# Patient Record
Sex: MALE | Race: WHITE | NOT HISPANIC OR LATINO | Employment: FULL TIME | URBAN - METROPOLITAN AREA
[De-identification: names, ages, dates, MRNs, and addresses within clinical notes are randomized per-mention and may not be internally consistent; named-entity substitution may affect disease eponyms.]

---

## 2019-03-15 ENCOUNTER — HOSPITAL ENCOUNTER (OUTPATIENT)
Dept: RADIOLOGY | Facility: HOSPITAL | Age: 54
Discharge: HOME | End: 2019-03-15
Attending: PHYSICIAN ASSISTANT
Payer: COMMERCIAL

## 2019-03-15 ENCOUNTER — OFFICE VISIT (OUTPATIENT)
Dept: ORTHOPEDICS | Facility: CLINIC | Age: 54
End: 2019-03-15
Payer: COMMERCIAL

## 2019-03-15 VITALS — BODY MASS INDEX: 31.81 KG/M2 | HEIGHT: 73 IN | WEIGHT: 240 LBS

## 2019-03-15 DIAGNOSIS — M25.571 RIGHT ANKLE PAIN, UNSPECIFIED CHRONICITY: ICD-10-CM

## 2019-03-15 DIAGNOSIS — M25.461 EFFUSION OF RIGHT KNEE: Primary | ICD-10-CM

## 2019-03-15 DIAGNOSIS — M25.561 RIGHT KNEE PAIN, UNSPECIFIED CHRONICITY: ICD-10-CM

## 2019-03-15 DIAGNOSIS — M17.11 PRIMARY OSTEOARTHRITIS OF ONE KNEE, RIGHT: ICD-10-CM

## 2019-03-15 PROBLEM — M19.071 PRIMARY OSTEOARTHRITIS OF RIGHT ANKLE: Status: ACTIVE | Noted: 2019-03-15

## 2019-03-15 LAB
APPEARANCE FLD: NORMAL
BODY FLD TYPE: ABNORMAL
BODY FLD TYPE: NORMAL
COLOR FLD: YELLOW
CRYSTALS FLD MICRO: POSITIVE
GRAM STN SPEC: NORMAL
GRAM STN SPEC: NORMAL
LYMPHOCYTES NFR FLD MANUAL: 18 %
MONOS+MACROS NFR FLD MANUAL: 11 %
NEUTROPHILS NFR FLD MANUAL: 71 %
PATH INTERP FLD-IMP: NORMAL
WBC # FLD: NORMAL /CU MM

## 2019-03-15 PROCEDURE — 73610 XR ANKLE COMPLETE 3 VIEW RIGHT: ICD-10-PCS | Mod: 26,RT,, | Performed by: RADIOLOGY

## 2019-03-15 PROCEDURE — 73562 XR KNEE ORTHO RIGHT WITH FLEXION: ICD-10-PCS | Mod: 26,59,RT, | Performed by: RADIOLOGY

## 2019-03-15 PROCEDURE — 73562 X-RAY EXAM OF KNEE 3: CPT | Mod: 26,59,RT, | Performed by: RADIOLOGY

## 2019-03-15 PROCEDURE — 20610 PR DRAIN/INJECT LARGE JOINT/BURSA: ICD-10-PCS | Mod: RT,S$GLB,, | Performed by: PHYSICIAN ASSISTANT

## 2019-03-15 PROCEDURE — 99204 OFFICE O/P NEW MOD 45 MIN: CPT | Mod: 25,S$GLB,, | Performed by: PHYSICIAN ASSISTANT

## 2019-03-15 PROCEDURE — 73564 XR KNEE ORTHO RIGHT WITH FLEXION: ICD-10-PCS | Mod: 26,RT,, | Performed by: RADIOLOGY

## 2019-03-15 PROCEDURE — 73562 X-RAY EXAM OF KNEE 3: CPT | Mod: TC,RT

## 2019-03-15 PROCEDURE — 99999 PR PBB SHADOW E&M-NEW PATIENT-LVL IV: ICD-10-PCS | Mod: PBBFAC,,, | Performed by: PHYSICIAN ASSISTANT

## 2019-03-15 PROCEDURE — 89060 EXAM SYNOVIAL FLUID CRYSTALS: CPT

## 2019-03-15 PROCEDURE — 89051 BODY FLUID CELL COUNT: CPT

## 2019-03-15 PROCEDURE — 87205 SMEAR GRAM STAIN: CPT

## 2019-03-15 PROCEDURE — 73610 X-RAY EXAM OF ANKLE: CPT | Mod: 26,RT,, | Performed by: RADIOLOGY

## 2019-03-15 PROCEDURE — 99999 PR PBB SHADOW E&M-NEW PATIENT-LVL IV: CPT | Mod: PBBFAC,,, | Performed by: PHYSICIAN ASSISTANT

## 2019-03-15 PROCEDURE — 99204 PR OFFICE/OUTPT VISIT, NEW, LEVL IV, 45-59 MIN: ICD-10-PCS | Mod: 25,S$GLB,, | Performed by: PHYSICIAN ASSISTANT

## 2019-03-15 PROCEDURE — 73610 X-RAY EXAM OF ANKLE: CPT | Mod: TC,RT

## 2019-03-15 PROCEDURE — 20610 DRAIN/INJ JOINT/BURSA W/O US: CPT | Mod: RT,S$GLB,, | Performed by: PHYSICIAN ASSISTANT

## 2019-03-15 PROCEDURE — 73564 X-RAY EXAM KNEE 4 OR MORE: CPT | Mod: 26,RT,, | Performed by: RADIOLOGY

## 2019-03-15 RX ORDER — TRIAMCINOLONE ACETONIDE 40 MG/ML
40 INJECTION, SUSPENSION INTRA-ARTICULAR; INTRAMUSCULAR
Status: DISCONTINUED | OUTPATIENT
Start: 2019-03-15 | End: 2019-03-15

## 2019-03-15 RX ORDER — CELECOXIB 200 MG/1
200 CAPSULE ORAL DAILY
Refills: 0 | COMMUNITY
Start: 2019-03-07 | End: 2019-07-24 | Stop reason: SDUPTHER

## 2019-03-15 RX ORDER — CELECOXIB 200 MG/1
200 CAPSULE ORAL DAILY
Qty: 30 CAPSULE | Refills: 2 | Status: SHIPPED | OUTPATIENT
Start: 2019-03-15 | End: 2019-04-14

## 2019-03-15 RX ORDER — TRIAMCINOLONE ACETONIDE 40 MG/ML
40 INJECTION, SUSPENSION INTRA-ARTICULAR; INTRAMUSCULAR
Status: COMPLETED | OUTPATIENT
Start: 2019-03-15 | End: 2019-03-15

## 2019-03-15 RX ADMIN — TRIAMCINOLONE ACETONIDE 40 MG: 40 INJECTION, SUSPENSION INTRA-ARTICULAR; INTRAMUSCULAR at 10:03

## 2019-03-15 NOTE — PROGRESS NOTES
SUBJECTIVE:     Chief Complaint & History of Present Illness:  Josh Bell is a 53 y.o. year old male here with a history of intermittent right knee pain and swelling which started 9 days ago.  He has a history of gout and reports occasional flares in his knee but they usually resolved with rest, ice and celebrex.  His pain began after he stopped taking his celebrex prescription, however since he received a new prescription last week, his pain has not resolved.  There is not a history of trauma.  The pain is located in the medial aspect of the knee.  The pain is described as achy, 8/10.  There is not radiation.  There is not catching or locking.  Aggravating factors include prolonged activity, going up and down stairs and inactivity.  Associated symptoms include effusion.  There is not numbness or tingling of the lower extremity.  There is not back pain. Previous treatments include ice, rest and celebrex which have provided minimal relief.  There is not a history of previous injury or surgery to the knee.  The patient does use an assistive device.    He also complains of some mild pain and swelling in his right ankle.  This has been intermittent for many years and he has been treated by an ankle specialist in the past.  He states most of his pain is along the outside of his ankle.  He denies foot pain or heel pain.  He has been on celebrex for the osteoarthritis in his ankle, which provides good relief.  He denies new injury.  He usually improves with rest, ice and NSAIDS.  His current pain does not feel any different than the usual pain he experiences when his arthritis flares.       Review of patient's allergies indicates:  Allergies not on file      Current Outpatient Medications   Medication Sig Dispense Refill    celecoxib (CELEBREX) 200 MG capsule Take 200 mg by mouth once daily.  0     No current facility-administered medications for this visit.        Past Medical History:   Diagnosis Date     "Gout        History reviewed. No pertinent surgical history.    Vital Signs (Most Recent)  There were no vitals filed for this visit.        Review of Systems:  ROS:  Constitutional: no fever or chills, pain not controlled, positive for fevers a couple of day ago  Eyes: no visual changes  ENT: no nasal congestion or sore throat  Respiratory: no shortness of breat, positive for cough  Cardiovascular: no chest pain or palpitations  Gastrointestinal: no nausea or vomiting, tolerating diet  Genitourinary: no hematuria or dysuria  Integument/Breast: positive for rash on right lower leg  Hematologic/Lymphatic: no easy bruising or lymphadenopathy  Musculoskeletal: no arthralgias or myalgias, positive for ankle pain and knee pain  Neurological: no seizures or tremors  Behavioral/Psych: no auditory or visual hallucinations  Endocrine: no heat or cold intolerance      OBJECTIVE:     PHYSICAL EXAM:  Height: 6' 1" (185.4 cm) Weight: 108.8 kg (239 lb 15.5 oz), General Appearance: Well nourished, well developed, in no acute distress.  Neurological: Mood & affect are normal.  right  Knee Exam:  Knee Range of Motion:  5-120   Effusion:yes  Condition of skin: Skin surrounding knee intact.  There is a superficial rash located along the distal anterior tibia.  No signs of infection.   Location of tenderness: Medial joint line and Patella   Strength:equal bilaterally  Stability:  stable to testing  Varus /Valgus stress:  normal  Talha:   negative    The right ankle is examined.  There is mild swelling about the ankle.  There is mild effusion present.  There is tenderness to the lateral aspect of the ankle.  ROM shows 20 degrees of dorsiflexion, 30 degrees of plantar flexion.  There is not instability laterally and there is not instability medially.  There is not tenderness to the forefoot and hindfoot.  There is not tenderness to the calf.  Skin, pulses and sensation of the right lower extremity are intact.    IMAGING:  X-ray of the " right knee and right ankle were ordered in clinic and reviewed by me.    Right knee:  Mild joint space narrowing in the medial and patellofemoral compartments with osteophyte formation.  No fracture or dislocation.    Right ankle:  Moderate DJD with osteophyte formation and subchondral cyst      ASSESSMENT/PLAN:   53 y.o. year old male with right knee effusion, right knee osteoarthritis and right ankle osteoarthritis    Plan: We discussed with the patient at length all the different treatment options available for the knee including anti-inflammatories, rest, ice, and occasional cortisone injections for temporary relief.    -He was given a prescription for celebrex as it provides good pain relief for his ankle and knee pain.  Recommend follow up with foot and ankle specialist if ankle pain worsens.  Continue ice, rest, elevation as needed.  -Due to long term prescription NSAID use, I recommend follow up with PCP for any prescription longer than the 90 day supply I gave him.  I recommended follow up labs to monitor kidney function if he is to continue long-term use.  -Aspiration of knee was performed and joint fluid analysis ordered.  I will call him with the results.      Procedure note:  The injection site was identified and the skin was prepared with a betadine solution. The right knee joint was aspirated and 50 cc of cloudy yellow fluid was withdrawn from the knee joint. The right knee joint was then injected with 1 ml of kenalog and 4 ml of Lidocaine under sterile technique. Josh Bell tolerated the procedure well and he was advised to rest the right knee today, ice and elevation. He did receive immediate relief of the pain in and about his right knee. He was told this would be short lived and is secondary to the lidocaine. He may have an increase in his discomfort tonight followed by steady improvement over the next several days. It may take 1-3 weeks following the injection to get the full benefit of  the medication.  I will see him back as needed.               .

## 2019-03-15 NOTE — PATIENT INSTRUCTIONS
Your right knee was injected with two medications today: a numbing medicine (lidocaine) and a corticosteroid (kenalog).  The numbing medicine works immediately and works for a few hours. The steroid medication takes 2-3 days to work.   You may notice that your pain returns or even worsens after the numbing medicine wears off.    If pain worsens, treat with ice 30 minutes on and 10 minutes off the area, over the counter or prescription anti-inflammatories and rest.    Call for any redness, fevers, chills or inability to move the joint. Call the office if pain does not improve in 2-3 days.

## 2019-03-18 ENCOUNTER — TELEPHONE (OUTPATIENT)
Dept: ORTHOPEDICS | Facility: CLINIC | Age: 54
End: 2019-03-18

## 2019-03-18 NOTE — TELEPHONE ENCOUNTER
I called the patient to discuss the results of his joint fluid analysis.  He was improving since his steroid injection.  I recommend follow up with a primary care physician for long-term management of his gout.

## 2019-07-24 RX ORDER — CELECOXIB 200 MG/1
CAPSULE ORAL
Qty: 30 CAPSULE | Refills: 0 | Status: SHIPPED | OUTPATIENT
Start: 2019-07-24

## 2022-06-29 ENCOUNTER — HOSPITAL ENCOUNTER (OUTPATIENT)
Dept: RADIOLOGY | Facility: HOSPITAL | Age: 57
Discharge: HOME | End: 2022-06-29
Attending: INTERNAL MEDICINE
Payer: COMMERCIAL

## 2022-06-29 ENCOUNTER — OFFICE VISIT (OUTPATIENT)
Dept: RHEUMATOLOGY | Facility: CLINIC | Age: 57
End: 2022-06-29
Payer: COMMERCIAL

## 2022-06-29 VITALS
HEIGHT: 73 IN | DIASTOLIC BLOOD PRESSURE: 80 MMHG | HEART RATE: 67 BPM | WEIGHT: 200 LBS | SYSTOLIC BLOOD PRESSURE: 124 MMHG | BODY MASS INDEX: 26.51 KG/M2

## 2022-06-29 DIAGNOSIS — M17.11 PRIMARY OSTEOARTHRITIS OF RIGHT KNEE: Primary | ICD-10-CM

## 2022-06-29 DIAGNOSIS — M17.11 PRIMARY OSTEOARTHRITIS OF RIGHT KNEE: ICD-10-CM

## 2022-06-29 DIAGNOSIS — R05.9 COUGH: ICD-10-CM

## 2022-06-29 PROCEDURE — 99204 PR OFFICE/OUTPT VISIT, NEW, LEVL IV, 45-59 MIN: ICD-10-PCS | Mod: S$GLB,,, | Performed by: INTERNAL MEDICINE

## 2022-06-29 PROCEDURE — 71046 X-RAY EXAM CHEST 2 VIEWS: CPT | Mod: TC,FY,PO

## 2022-06-29 PROCEDURE — 71046 X-RAY EXAM CHEST 2 VIEWS: CPT | Mod: 26,,, | Performed by: RADIOLOGY

## 2022-06-29 PROCEDURE — 99999 PR PBB SHADOW E&M-EST. PATIENT-LVL III: ICD-10-PCS | Mod: PBBFAC,,, | Performed by: INTERNAL MEDICINE

## 2022-06-29 PROCEDURE — 71046 XR CHEST PA AND LATERAL: ICD-10-PCS | Mod: 26,,, | Performed by: RADIOLOGY

## 2022-06-29 PROCEDURE — 77077 XR ARTHRITIS SURVEY: ICD-10-PCS | Mod: 26,,, | Performed by: RADIOLOGY

## 2022-06-29 PROCEDURE — 99999 PR PBB SHADOW E&M-EST. PATIENT-LVL III: CPT | Mod: PBBFAC,,, | Performed by: INTERNAL MEDICINE

## 2022-06-29 PROCEDURE — 77077 JOINT SURVEY SINGLE VIEW: CPT | Mod: TC,PO

## 2022-06-29 PROCEDURE — 99204 OFFICE O/P NEW MOD 45 MIN: CPT | Mod: S$GLB,,, | Performed by: INTERNAL MEDICINE

## 2022-06-29 PROCEDURE — 77077 JOINT SURVEY SINGLE VIEW: CPT | Mod: 26,,, | Performed by: RADIOLOGY

## 2022-06-29 RX ORDER — ALLOPURINOL 300 MG/1
300 TABLET ORAL DAILY
COMMUNITY
Start: 2022-06-12 | End: 2022-11-02 | Stop reason: SDUPTHER

## 2022-06-29 RX ORDER — METHOTREXATE 2.5 MG/1
10 TABLET ORAL
COMMUNITY
Start: 2022-06-10 | End: 2022-11-02 | Stop reason: SDUPTHER

## 2022-06-29 NOTE — PROGRESS NOTES
Subjective:       Patient ID: Josh Bell is a 56 y.o. male.    Chief Complaint: No chief complaint on file.    HPI 56 year old M with PMH of crystal proven gout of right knee,RA, type II DM, HTN here for evaluation. Around age 45, he developed pain and swelling of left knee and left ankle. Saw orthopedics and they did xrays.He was started on allopurinol at age 45 but he was not consistent with it.   He reports he was told to have xrays concerning for RA in left ankle. He was given celebrex which he took for several months.  He had episode of right knee pain and swelling in 2019.  Aspiration consistent with gouty arthritis. At some point, he started to have pain daily. He was having pain in shoulder, left elbow pain, hands, knees, ankles,and the feet.  He saw rheumatologist in [place] that diagnosed him with RA. He is taking MTX 4 pills once a week. He has never taken daily folic acid.  He is taking allopurinol 300mg po q day. He used to take celebrex 200mg once a day and stopped it 6 months ago because he didn't need it any more.  He had recent flare involving involving right knee and lasted a few days and then it went away.Denies history of psoriasis.    Denies history of podagra. He reports he has history of podagra.     Family hx: aunt: gout      Past Medical History:   Diagnosis Date    Gout        Review of Systems   Constitutional: Negative for activity change, appetite change, chills, diaphoresis, fatigue and fever.   HENT: Negative for ear discharge, ear pain, hearing loss, mouth sores, nosebleeds and sinus pressure.    Eyes: Negative.  Negative for photophobia, pain, discharge, redness and itching.   Respiratory: Negative for cough, chest tightness and shortness of breath.    Cardiovascular: Negative for chest pain, palpitations and leg swelling.   Gastrointestinal: Negative for abdominal distention, blood in stool and nausea.   Endocrine: Negative for cold intolerance, heat intolerance,  polydipsia and polyphagia.   Genitourinary: Negative for flank pain, genital sores and hematuria.   Musculoskeletal: Positive for arthralgias. Negative for back pain, gait problem, joint swelling, myalgias, neck pain and neck stiffness.   Skin: Negative for color change, pallor and rash.   Neurological: Negative for dizziness, weakness, light-headedness and headaches.   Hematological: Negative for adenopathy. Does not bruise/bleed easily.   Psychiatric/Behavioral: Negative for decreased concentration and hallucinations. The patient is not nervous/anxious.          Objective:   There were no vitals taken for this visit.     Physical Exam   Constitutional: He appears obese.  Non-toxic appearance. No distress.   HENT:   Head: Normocephalic and atraumatic.   Right Ear: Tympanic membrane normal.   Left Ear: Tympanic membrane normal.   Nose: No rhinorrhea or nasal congestion.   Mouth/Throat: Mucous membranes are dry. No oropharyngeal exudate or posterior oropharyngeal erythema.   Eyes: Pupils are equal, round, and reactive to light. Right eye exhibits no discharge. Left eye exhibits no discharge.   Cardiovascular: Normal rate and regular rhythm. Exam reveals no gallop and no friction rub.   No murmur heard.  Pulmonary/Chest: Effort normal and breath sounds normal. No stridor. No respiratory distress.   Abdominal: Soft. He exhibits no distension and no mass. There is no abdominal tenderness. No hernia.   Musculoskeletal:         General: No swelling or tenderness.   Neurological: He is alert. He displays no weakness. No cranial nerve deficit or sensory deficit.   Skin: No jaundice or pallor.        No data to display     Assessment:     56 year old M with PMH of crystal proven gout of right knee,RA, type II DM, HTN here for evaluation.    #rheumatoid arthritis: he reports history of bilateral joint pain and swelling.No synovitis on exam today.  He has been on MTX 4 pills once a week for years without folic acid. Had been  managed by rheum in [place].  Start folic acid 1 mg po day  Continue MTX 4 pills once a week (Risks and benefits of initiating MTX discussed. Patient aware that risks include and not limited to lung toxicity, liver abnormalities, cell count abnormalities, malignancy, and allergic  reaction to medication. Patient agrees with starting medication.  Labs  xrays    #gouty arthritis: he has history of crystal proven gout in right knee.  Labs  Continue allopurinol 300mg po qday    Obtain outside records        No diagnosis found.      45 * minutes of total time spent on the encounter, which includes face to face time and non-face to face time preparing to see the patient (eg, review of tests), Obtaining and/or reviewing separately obtained history, Documenting clinical information in the electronic or other health record, Independently interpreting results (not separately reported) and communicating results to the patient/family/caregiver, or Care coordination (not separately reported).

## 2022-07-06 LAB
ALBUMIN SERPL-MCNC: 4.5 G/DL (ref 3.6–5.1)
ALBUMIN/GLOB SERPL: 1.7 (CALC) (ref 1–2.5)
ALP SERPL-CCNC: 61 U/L (ref 35–144)
ALT SERPL-CCNC: 14 U/L (ref 9–46)
ANA SER QL IF: NEGATIVE
AST SERPL-CCNC: 14 U/L (ref 10–35)
BASOPHILS # BLD AUTO: 31 CELLS/UL (ref 0–200)
BASOPHILS NFR BLD AUTO: 0.8 %
BILIRUB SERPL-MCNC: 0.5 MG/DL (ref 0.2–1.2)
BUN SERPL-MCNC: 14 MG/DL (ref 7–25)
BUN/CREAT SERPL: NORMAL (CALC) (ref 6–22)
CALCIUM SERPL-MCNC: 9.6 MG/DL (ref 8.6–10.3)
CCP IGG SERPL-ACNC: <16 UNITS
CHLORIDE SERPL-SCNC: 104 MMOL/L (ref 98–110)
CO2 SERPL-SCNC: 30 MMOL/L (ref 20–32)
CREAT SERPL-MCNC: 0.81 MG/DL (ref 0.7–1.33)
EOSINOPHIL # BLD AUTO: 70 CELLS/UL (ref 15–500)
EOSINOPHIL NFR BLD AUTO: 1.8 %
ERYTHROCYTE [DISTWIDTH] IN BLOOD BY AUTOMATED COUNT: 12.2 % (ref 11–15)
GLOBULIN SER CALC-MCNC: 2.6 G/DL (CALC) (ref 1.9–3.7)
GLUCOSE SERPL-MCNC: 92 MG/DL (ref 65–139)
HCT VFR BLD AUTO: 41.5 % (ref 38.5–50)
HCV AB S/CO SERPL IA: 0.01
HCV AB SERPL QL IA: NORMAL
HGB BLD-MCNC: 13.8 G/DL (ref 13.2–17.1)
HIV 1+2 AB+HIV1 P24 AG SERPL QL IA: NORMAL
LYMPHOCYTES # BLD AUTO: 1186 CELLS/UL (ref 850–3900)
LYMPHOCYTES NFR BLD AUTO: 30.4 %
MCH RBC QN AUTO: 29.9 PG (ref 27–33)
MCHC RBC AUTO-ENTMCNC: 33.3 G/DL (ref 32–36)
MCV RBC AUTO: 89.8 FL (ref 80–100)
MONOCYTES # BLD AUTO: 398 CELLS/UL (ref 200–950)
MONOCYTES NFR BLD AUTO: 10.2 %
NEUTROPHILS # BLD AUTO: 2215 CELLS/UL (ref 1500–7800)
NEUTROPHILS NFR BLD AUTO: 56.8 %
PLATELET # BLD AUTO: 178 THOUSAND/UL (ref 140–400)
PMV BLD REES-ECKER: 10 FL (ref 7.5–12.5)
POTASSIUM SERPL-SCNC: 4.3 MMOL/L (ref 3.5–5.3)
PROT SERPL-MCNC: 7.1 G/DL (ref 6.1–8.1)
RBC # BLD AUTO: 4.62 MILLION/UL (ref 4.2–5.8)
RHEUMATOID FACT SERPL-ACNC: <14 IU/ML
SODIUM SERPL-SCNC: 141 MMOL/L (ref 135–146)
WBC # BLD AUTO: 3.9 THOUSAND/UL (ref 3.8–10.8)

## 2022-07-08 LAB
GAMMA INTERFERON BACKGROUND BLD IA-ACNC: 0.02 IU/ML
HBV CORE AB SERPL QL IA: NORMAL
HBV SURFACE AG SERPL QL IA: NORMAL
M TB IFN-G BLD-IMP: NEGATIVE
M TB IFN-G CD4+ BCKGRND COR BLD-ACNC: 0 IU/ML
M TB IFN-G CD4+CD8+ BCKGRND COR BLD-ACNC: 0.01 IU/ML
MITOGEN IGNF BCKGRD COR BLD-ACNC: >10 IU/ML

## 2022-07-16 DIAGNOSIS — M17.11 PRIMARY OSTEOARTHRITIS OF RIGHT KNEE: Primary | ICD-10-CM

## 2022-09-26 ENCOUNTER — LAB VISIT (OUTPATIENT)
Dept: LAB | Facility: HOSPITAL | Age: 57
End: 2022-09-26
Attending: INTERNAL MEDICINE
Payer: COMMERCIAL

## 2022-09-26 DIAGNOSIS — M17.11 PRIMARY OSTEOARTHRITIS OF RIGHT KNEE: ICD-10-CM

## 2022-09-26 LAB
ALBUMIN SERPL BCP-MCNC: 4.7 G/DL (ref 3.5–5.2)
ALP SERPL-CCNC: 65 U/L (ref 55–135)
ALT SERPL W/O P-5'-P-CCNC: 14 U/L (ref 10–44)
ANION GAP SERPL CALC-SCNC: 7 MMOL/L (ref 8–16)
AST SERPL-CCNC: 17 U/L (ref 10–40)
BASOPHILS # BLD AUTO: 0.02 K/UL (ref 0–0.2)
BASOPHILS NFR BLD: 0.4 % (ref 0–1.9)
BILIRUB SERPL-MCNC: 0.7 MG/DL (ref 0.1–1)
BUN SERPL-MCNC: 17 MG/DL (ref 6–20)
CALCIUM SERPL-MCNC: 9.3 MG/DL (ref 8.7–10.5)
CCP AB SER IA-ACNC: 0.6 U/ML
CHLORIDE SERPL-SCNC: 105 MMOL/L (ref 95–110)
CO2 SERPL-SCNC: 27 MMOL/L (ref 23–29)
CREAT SERPL-MCNC: 0.7 MG/DL (ref 0.5–1.4)
CRP SERPL-MCNC: 0.3 MG/L (ref 0–8.2)
DIFFERENTIAL METHOD: ABNORMAL
EOSINOPHIL # BLD AUTO: 0.1 K/UL (ref 0–0.5)
EOSINOPHIL NFR BLD: 1.1 % (ref 0–8)
ERYTHROCYTE [DISTWIDTH] IN BLOOD BY AUTOMATED COUNT: 12.6 % (ref 11.5–14.5)
ERYTHROCYTE [SEDIMENTATION RATE] IN BLOOD BY PHOTOMETRIC METHOD: 6 MM/HR (ref 0–23)
EST. GFR  (NO RACE VARIABLE): >60 ML/MIN/1.73 M^2
GLUCOSE SERPL-MCNC: 90 MG/DL (ref 70–110)
HCT VFR BLD AUTO: 46.2 % (ref 40–54)
HGB BLD-MCNC: 14.2 G/DL (ref 14–18)
IMM GRANULOCYTES # BLD AUTO: 0.01 K/UL (ref 0–0.04)
IMM GRANULOCYTES NFR BLD AUTO: 0.2 % (ref 0–0.5)
LYMPHOCYTES # BLD AUTO: 1.1 K/UL (ref 1–4.8)
LYMPHOCYTES NFR BLD: 24.7 % (ref 18–48)
MCH RBC QN AUTO: 29.8 PG (ref 27–31)
MCHC RBC AUTO-ENTMCNC: 30.7 G/DL (ref 32–36)
MCV RBC AUTO: 97 FL (ref 82–98)
MONOCYTES # BLD AUTO: 0.3 K/UL (ref 0.3–1)
MONOCYTES NFR BLD: 7.3 % (ref 4–15)
NEUTROPHILS # BLD AUTO: 3 K/UL (ref 1.8–7.7)
NEUTROPHILS NFR BLD: 66.3 % (ref 38–73)
NRBC BLD-RTO: 0 /100 WBC
PLATELET # BLD AUTO: 163 K/UL (ref 150–450)
PMV BLD AUTO: 11.2 FL (ref 9.2–12.9)
POTASSIUM SERPL-SCNC: 4.2 MMOL/L (ref 3.5–5.1)
PROT SERPL-MCNC: 7.4 G/DL (ref 6–8.4)
RBC # BLD AUTO: 4.77 M/UL (ref 4.6–6.2)
SODIUM SERPL-SCNC: 139 MMOL/L (ref 136–145)
URATE SERPL-MCNC: 6 MG/DL (ref 3.4–7)
WBC # BLD AUTO: 4.49 K/UL (ref 3.9–12.7)

## 2022-09-26 PROCEDURE — 80053 COMPREHEN METABOLIC PANEL: CPT | Performed by: INTERNAL MEDICINE

## 2022-09-26 PROCEDURE — 84550 ASSAY OF BLOOD/URIC ACID: CPT | Performed by: INTERNAL MEDICINE

## 2022-09-26 PROCEDURE — 86140 C-REACTIVE PROTEIN: CPT | Performed by: INTERNAL MEDICINE

## 2022-09-26 PROCEDURE — 85652 RBC SED RATE AUTOMATED: CPT | Performed by: INTERNAL MEDICINE

## 2022-09-26 PROCEDURE — 86200 CCP ANTIBODY: CPT | Performed by: INTERNAL MEDICINE

## 2022-09-26 PROCEDURE — 36415 COLL VENOUS BLD VENIPUNCTURE: CPT | Mod: PO | Performed by: INTERNAL MEDICINE

## 2022-09-26 PROCEDURE — 85025 COMPLETE CBC W/AUTO DIFF WBC: CPT | Performed by: INTERNAL MEDICINE

## 2022-11-02 ENCOUNTER — OFFICE VISIT (OUTPATIENT)
Dept: RHEUMATOLOGY | Facility: CLINIC | Age: 57
End: 2022-11-02
Payer: COMMERCIAL

## 2022-11-02 VITALS
HEART RATE: 72 BPM | SYSTOLIC BLOOD PRESSURE: 105 MMHG | HEIGHT: 73 IN | BODY MASS INDEX: 26.39 KG/M2 | DIASTOLIC BLOOD PRESSURE: 65 MMHG

## 2022-11-02 DIAGNOSIS — M06.9 RHEUMATOID ARTHRITIS INVOLVING MULTIPLE JOINTS: ICD-10-CM

## 2022-11-02 DIAGNOSIS — M17.11 PRIMARY OSTEOARTHRITIS OF RIGHT KNEE: Primary | ICD-10-CM

## 2022-11-02 PROCEDURE — 99999 PR PBB SHADOW E&M-EST. PATIENT-LVL III: ICD-10-PCS | Mod: PBBFAC,,, | Performed by: INTERNAL MEDICINE

## 2022-11-02 PROCEDURE — 99999 PR PBB SHADOW E&M-EST. PATIENT-LVL III: CPT | Mod: PBBFAC,,, | Performed by: INTERNAL MEDICINE

## 2022-11-02 PROCEDURE — 99214 OFFICE O/P EST MOD 30 MIN: CPT | Mod: S$GLB,,, | Performed by: INTERNAL MEDICINE

## 2022-11-02 PROCEDURE — 99214 PR OFFICE/OUTPT VISIT, EST, LEVL IV, 30-39 MIN: ICD-10-PCS | Mod: S$GLB,,, | Performed by: INTERNAL MEDICINE

## 2022-11-02 RX ORDER — METHOTREXATE 2.5 MG/1
10 TABLET ORAL
Qty: 48 TABLET | Refills: 1 | Status: SHIPPED | OUTPATIENT
Start: 2022-11-02 | End: 2023-04-19

## 2022-11-02 RX ORDER — FOLIC ACID 0.8 MG
800 TABLET ORAL DAILY
COMMUNITY

## 2022-11-02 RX ORDER — ALLOPURINOL 300 MG/1
300 TABLET ORAL DAILY
Qty: 90 TABLET | Refills: 4 | Status: SHIPPED | OUTPATIENT
Start: 2022-11-02 | End: 2023-12-01

## 2022-11-02 RX ORDER — FOLIC ACID 1 MG/1
1 TABLET ORAL DAILY
Qty: 90 TABLET | Refills: 4 | Status: SHIPPED | OUTPATIENT
Start: 2022-11-02 | End: 2023-12-01

## 2022-11-02 NOTE — PROGRESS NOTES
Subjective:       Patient ID: Josh Bell is a 56 y.o. male.    Chief Complaint: No chief complaint on file.    HPI 56 year old M with PMH of crystal proven gout of right knee,RA,  HTN here for evaluation. Around age 45, he developed pain and swelling of left knee and left ankle. Saw orthopedics and they did xrays.He was started on allopurinol at age 45 but he was not consistent with it.   He reports he was told to have xrays concerning for RA in right ankle. He was given celebrex which he took for several months.  He had episode of right knee pain and swelling in 2019.  Aspiration consistent with gouty arthritis. At some point, he started to have pain daily. He was having pain in shoulder, left elbow pain, hands, knees, ankles,and the feet.  He saw rheumatologist in [place] that diagnosed him with RA. He is taking MTX 4 pills once a week. He has never taken daily folic acid.  He is taking allopurinol 300mg po q day. He used to take celebrex 200mg once a day and stopped it 6 months ago because he didn't need it any more.  He had recent flare involving involving right knee and lasted a few days and then it went away.Denies history of psoriasis.    Denies history of podagra. He reports he has history of podagra.     Family hx: aunt: gout      Interval history: He is taking MTX 4 pills once a week.  He is taking folic acid.  Denies any joint pain, swelling or stiffness.      Past Medical History:   Diagnosis Date    Gout        Review of Systems   Constitutional: Negative for activity change, appetite change, chills, diaphoresis, fatigue and fever.   HENT: Negative for ear discharge, ear pain, hearing loss, mouth sores, nosebleeds and sinus pressure.    Eyes: Negative.  Negative for photophobia, pain, discharge, redness and itching.   Respiratory: Negative for cough, chest tightness and shortness of breath.    Cardiovascular: Negative for chest pain, palpitations and leg swelling.   Gastrointestinal: Negative  for abdominal distention, blood in stool and nausea.   Endocrine: Negative for cold intolerance, heat intolerance, polydipsia and polyphagia.   Genitourinary: Negative for flank pain, genital sores and hematuria.   Musculoskeletal: Positive for arthralgias. Negative for back pain, gait problem, joint swelling, myalgias, neck pain and neck stiffness.   Skin: Negative for color change, pallor and rash.   Neurological: Negative for dizziness, weakness, light-headedness and headaches.   Hematological: Negative for adenopathy. Does not bruise/bleed easily.   Psychiatric/Behavioral: Negative for decreased concentration and hallucinations. The patient is not nervous/anxious.          Objective:   There were no vitals taken for this visit.     Physical Exam   Constitutional: He appears obese.  Non-toxic appearance. No distress.   HENT:   Head: Normocephalic and atraumatic.   Right Ear: Tympanic membrane normal.   Left Ear: Tympanic membrane normal.   Nose: No rhinorrhea or nasal congestion.   Mouth/Throat: Mucous membranes are dry. No oropharyngeal exudate or posterior oropharyngeal erythema.   Eyes: Pupils are equal, round, and reactive to light. Right eye exhibits no discharge. Left eye exhibits no discharge.   Cardiovascular: Normal rate and regular rhythm. Exam reveals no gallop and no friction rub.   No murmur heard.  Pulmonary/Chest: Effort normal and breath sounds normal. No stridor. No respiratory distress.   Abdominal: Soft. He exhibits no distension and no mass. There is no abdominal tenderness. No hernia.   Musculoskeletal:         General: No swelling or tenderness.   Neurological: He is alert. He displays no weakness. No cranial nerve deficit or sensory deficit.   Skin: No jaundice or pallor.        No data to display     Assessment:     57  year old M with PMH of crystal proven gout of right knee,RA, type II DM, HTN here for follow up of rheumatoid arthritis.    #rheumatoid arthritis: he reports history of  bilateral joint pain and swelling.No synovitis on exam today.  He has been on MTX 4 pills once a week for years without folic acid. Had been managed by rheum in [place].  Continue folic acid 1 mg po day  Continue MTX 4 pills once a week (Risks and benefits of initiating MTX discussed. Patient aware that risks include and not limited to lung toxicity, liver abnormalities, cell count abnormalities, malignancy, and allergic  reaction to medication. Patient agrees with starting medication.  Labs every 3 months      #gouty arthritis: he has history of crystal proven gout in right knee.  Labs  Continue allopurinol 300mg po qday            No diagnosis found.      30  * minutes of total time spent on the encounter, which includes face to face time and non-face to face time preparing to see the patient (eg, review of tests), Obtaining and/or reviewing separately obtained history, Documenting clinical information in the electronic or other health record, Independently interpreting results (not separately reported) and communicating results to the patient/family/caregiver, or Care coordination (not separately reported).       Rtc in 12 weeks

## 2022-12-12 ENCOUNTER — LAB VISIT (OUTPATIENT)
Dept: LAB | Facility: HOSPITAL | Age: 57
End: 2022-12-12
Attending: INTERNAL MEDICINE
Payer: COMMERCIAL

## 2022-12-12 DIAGNOSIS — M17.11 PRIMARY OSTEOARTHRITIS OF RIGHT KNEE: ICD-10-CM

## 2022-12-12 LAB
ALBUMIN SERPL BCP-MCNC: 4.4 G/DL (ref 3.5–5.2)
ALP SERPL-CCNC: 60 U/L (ref 55–135)
ALT SERPL W/O P-5'-P-CCNC: 16 U/L (ref 10–44)
ANION GAP SERPL CALC-SCNC: 8 MMOL/L (ref 8–16)
AST SERPL-CCNC: 16 U/L (ref 10–40)
BASOPHILS # BLD AUTO: 0.03 K/UL (ref 0–0.2)
BASOPHILS NFR BLD: 0.5 % (ref 0–1.9)
BILIRUB SERPL-MCNC: 0.5 MG/DL (ref 0.1–1)
BUN SERPL-MCNC: 22 MG/DL (ref 6–20)
CALCIUM SERPL-MCNC: 9.4 MG/DL (ref 8.7–10.5)
CHLORIDE SERPL-SCNC: 104 MMOL/L (ref 95–110)
CO2 SERPL-SCNC: 26 MMOL/L (ref 23–29)
CREAT SERPL-MCNC: 1 MG/DL (ref 0.5–1.4)
CRP SERPL-MCNC: <0.3 MG/L (ref 0–8.2)
DIFFERENTIAL METHOD: NORMAL
EOSINOPHIL # BLD AUTO: 0.1 K/UL (ref 0–0.5)
EOSINOPHIL NFR BLD: 1.6 % (ref 0–8)
ERYTHROCYTE [DISTWIDTH] IN BLOOD BY AUTOMATED COUNT: 12.7 % (ref 11.5–14.5)
ERYTHROCYTE [SEDIMENTATION RATE] IN BLOOD BY PHOTOMETRIC METHOD: 5 MM/HR (ref 0–23)
EST. GFR  (NO RACE VARIABLE): >60 ML/MIN/1.73 M^2
GLUCOSE SERPL-MCNC: 88 MG/DL (ref 70–110)
HCT VFR BLD AUTO: 43.5 % (ref 40–54)
HGB BLD-MCNC: 14.1 G/DL (ref 14–18)
IMM GRANULOCYTES # BLD AUTO: 0.01 K/UL (ref 0–0.04)
IMM GRANULOCYTES NFR BLD AUTO: 0.2 % (ref 0–0.5)
LYMPHOCYTES # BLD AUTO: 1.8 K/UL (ref 1–4.8)
LYMPHOCYTES NFR BLD: 30.6 % (ref 18–48)
MCH RBC QN AUTO: 30.5 PG (ref 27–31)
MCHC RBC AUTO-ENTMCNC: 32.4 G/DL (ref 32–36)
MCV RBC AUTO: 94 FL (ref 82–98)
MONOCYTES # BLD AUTO: 0.5 K/UL (ref 0.3–1)
MONOCYTES NFR BLD: 9.2 % (ref 4–15)
NEUTROPHILS # BLD AUTO: 3.3 K/UL (ref 1.8–7.7)
NEUTROPHILS NFR BLD: 57.9 % (ref 38–73)
NRBC BLD-RTO: 0 /100 WBC
PLATELET # BLD AUTO: 192 K/UL (ref 150–450)
PMV BLD AUTO: 10.7 FL (ref 9.2–12.9)
POTASSIUM SERPL-SCNC: 4.5 MMOL/L (ref 3.5–5.1)
PROT SERPL-MCNC: 7.3 G/DL (ref 6–8.4)
RBC # BLD AUTO: 4.63 M/UL (ref 4.6–6.2)
SODIUM SERPL-SCNC: 138 MMOL/L (ref 136–145)
URATE SERPL-MCNC: 7.5 MG/DL (ref 3.4–7)
WBC # BLD AUTO: 5.75 K/UL (ref 3.9–12.7)

## 2022-12-12 PROCEDURE — 84550 ASSAY OF BLOOD/URIC ACID: CPT | Performed by: INTERNAL MEDICINE

## 2022-12-12 PROCEDURE — 85025 COMPLETE CBC W/AUTO DIFF WBC: CPT | Performed by: INTERNAL MEDICINE

## 2022-12-12 PROCEDURE — 36415 COLL VENOUS BLD VENIPUNCTURE: CPT | Mod: PO | Performed by: INTERNAL MEDICINE

## 2022-12-12 PROCEDURE — 85652 RBC SED RATE AUTOMATED: CPT | Performed by: INTERNAL MEDICINE

## 2022-12-12 PROCEDURE — 80053 COMPREHEN METABOLIC PANEL: CPT | Performed by: INTERNAL MEDICINE

## 2022-12-12 PROCEDURE — 86140 C-REACTIVE PROTEIN: CPT | Performed by: INTERNAL MEDICINE

## 2023-01-09 DIAGNOSIS — M17.11 PRIMARY OSTEOARTHRITIS OF RIGHT KNEE: Primary | ICD-10-CM

## 2023-03-06 ENCOUNTER — LAB VISIT (OUTPATIENT)
Dept: LAB | Facility: HOSPITAL | Age: 58
End: 2023-03-06
Attending: INTERNAL MEDICINE
Payer: COMMERCIAL

## 2023-03-06 DIAGNOSIS — M17.11 PRIMARY OSTEOARTHRITIS OF RIGHT KNEE: ICD-10-CM

## 2023-03-06 LAB
ALBUMIN SERPL BCP-MCNC: 4.4 G/DL (ref 3.5–5.2)
ALP SERPL-CCNC: 59 U/L (ref 55–135)
ALT SERPL W/O P-5'-P-CCNC: 30 U/L (ref 10–44)
ANION GAP SERPL CALC-SCNC: 11 MMOL/L (ref 8–16)
AST SERPL-CCNC: 24 U/L (ref 10–40)
BILIRUB SERPL-MCNC: 0.4 MG/DL (ref 0.1–1)
BUN SERPL-MCNC: 17 MG/DL (ref 6–20)
CALCIUM SERPL-MCNC: 9.4 MG/DL (ref 8.7–10.5)
CHLORIDE SERPL-SCNC: 99 MMOL/L (ref 95–110)
CO2 SERPL-SCNC: 28 MMOL/L (ref 23–29)
CREAT SERPL-MCNC: 0.9 MG/DL (ref 0.5–1.4)
CRP SERPL-MCNC: 0.5 MG/L (ref 0–8.2)
EST. GFR  (NO RACE VARIABLE): >60 ML/MIN/1.73 M^2
GLUCOSE SERPL-MCNC: 111 MG/DL (ref 70–110)
POTASSIUM SERPL-SCNC: 4.2 MMOL/L (ref 3.5–5.1)
PROT SERPL-MCNC: 7.5 G/DL (ref 6–8.4)
SODIUM SERPL-SCNC: 138 MMOL/L (ref 136–145)

## 2023-03-06 PROCEDURE — 36415 COLL VENOUS BLD VENIPUNCTURE: CPT | Mod: PO | Performed by: INTERNAL MEDICINE

## 2023-03-06 PROCEDURE — 85652 RBC SED RATE AUTOMATED: CPT | Performed by: INTERNAL MEDICINE

## 2023-03-06 PROCEDURE — 85025 COMPLETE CBC W/AUTO DIFF WBC: CPT | Performed by: INTERNAL MEDICINE

## 2023-03-06 PROCEDURE — 86140 C-REACTIVE PROTEIN: CPT | Performed by: INTERNAL MEDICINE

## 2023-03-06 PROCEDURE — 80053 COMPREHEN METABOLIC PANEL: CPT | Performed by: INTERNAL MEDICINE

## 2023-03-07 LAB
BASOPHILS # BLD AUTO: 0.03 K/UL (ref 0–0.2)
BASOPHILS NFR BLD: 0.6 % (ref 0–1.9)
DIFFERENTIAL METHOD: NORMAL
EOSINOPHIL # BLD AUTO: 0.1 K/UL (ref 0–0.5)
EOSINOPHIL NFR BLD: 1.7 % (ref 0–8)
ERYTHROCYTE [DISTWIDTH] IN BLOOD BY AUTOMATED COUNT: 12.5 % (ref 11.5–14.5)
ERYTHROCYTE [SEDIMENTATION RATE] IN BLOOD BY PHOTOMETRIC METHOD: 4 MM/HR (ref 0–23)
HCT VFR BLD AUTO: 45.6 % (ref 40–54)
HGB BLD-MCNC: 14.6 G/DL (ref 14–18)
IMM GRANULOCYTES # BLD AUTO: 0.01 K/UL (ref 0–0.04)
IMM GRANULOCYTES NFR BLD AUTO: 0.2 % (ref 0–0.5)
LYMPHOCYTES # BLD AUTO: 1.6 K/UL (ref 1–4.8)
LYMPHOCYTES NFR BLD: 30.3 % (ref 18–48)
MCH RBC QN AUTO: 30.2 PG (ref 27–31)
MCHC RBC AUTO-ENTMCNC: 32 G/DL (ref 32–36)
MCV RBC AUTO: 94 FL (ref 82–98)
MONOCYTES # BLD AUTO: 0.5 K/UL (ref 0.3–1)
MONOCYTES NFR BLD: 10.3 % (ref 4–15)
NEUTROPHILS # BLD AUTO: 3 K/UL (ref 1.8–7.7)
NEUTROPHILS NFR BLD: 56.9 % (ref 38–73)
NRBC BLD-RTO: 0 /100 WBC
PLATELET # BLD AUTO: 174 K/UL (ref 150–450)
PMV BLD AUTO: 10.6 FL (ref 9.2–12.9)
RBC # BLD AUTO: 4.83 M/UL (ref 4.6–6.2)
WBC # BLD AUTO: 5.25 K/UL (ref 3.9–12.7)

## 2023-03-08 ENCOUNTER — OFFICE VISIT (OUTPATIENT)
Dept: RHEUMATOLOGY | Facility: CLINIC | Age: 58
End: 2023-03-08
Payer: COMMERCIAL

## 2023-03-08 VITALS
SYSTOLIC BLOOD PRESSURE: 115 MMHG | HEART RATE: 79 BPM | DIASTOLIC BLOOD PRESSURE: 72 MMHG | WEIGHT: 222 LBS | BODY MASS INDEX: 29.42 KG/M2 | HEIGHT: 73 IN

## 2023-03-08 DIAGNOSIS — M06.9 RHEUMATOID ARTHRITIS INVOLVING MULTIPLE JOINTS: Primary | ICD-10-CM

## 2023-03-08 DIAGNOSIS — M17.11 PRIMARY OSTEOARTHRITIS OF RIGHT KNEE: Primary | ICD-10-CM

## 2023-03-08 PROCEDURE — 99214 OFFICE O/P EST MOD 30 MIN: CPT | Mod: S$GLB,,, | Performed by: INTERNAL MEDICINE

## 2023-03-08 PROCEDURE — 99999 PR PBB SHADOW E&M-EST. PATIENT-LVL III: CPT | Mod: PBBFAC,,, | Performed by: INTERNAL MEDICINE

## 2023-03-08 PROCEDURE — 99214 PR OFFICE/OUTPT VISIT, EST, LEVL IV, 30-39 MIN: ICD-10-PCS | Mod: S$GLB,,, | Performed by: INTERNAL MEDICINE

## 2023-03-08 PROCEDURE — 99999 PR PBB SHADOW E&M-EST. PATIENT-LVL III: ICD-10-PCS | Mod: PBBFAC,,, | Performed by: INTERNAL MEDICINE

## 2023-03-08 NOTE — PROGRESS NOTES
Subjective:       Patient ID: Josh Bell is a 56 y.o. male.    Chief Complaint: No chief complaint on file.    HPI 56 year old M with PMH of crystal proven gout of right knee,RA,  HTN here for evaluation. Around age 45, he developed pain and swelling of left knee and left ankle. Saw orthopedics and they did xrays.He was started on allopurinol at age 45 but he was not consistent with it.   He reports he was told to have xrays concerning for RA in right ankle. He was given celebrex which he took for several months.  He had episode of right knee pain and swelling in 2019.  Aspiration consistent with gouty arthritis. At some point, he started to have pain daily. He was having pain in shoulder, left elbow pain, hands, knees, ankles,and the feet.  He saw rheumatologist in [place] that diagnosed him with RA. He is taking MTX 4 pills once a week. He has never taken daily folic acid.  He is taking allopurinol 300mg po q day. He used to take celebrex 200mg once a day and stopped it 6 months ago because he didn't need it any more.  He had recent flare involving involving right knee and lasted a few days and then it went away.Denies history of psoriasis.    Denies history of podagra. He reports he has history of podagra.     Family hx: aunt: gout      Interval history: He is taking MTX 4 pills once a week.  He is taking folic acid. He is taking allopurinol 300mg po qday.  He has some stiffness in neck.  Denies any joint pain, swelling or stiffness.      Past Medical History:   Diagnosis Date    Gout        Review of Systems   Constitutional: Negative for activity change, appetite change, chills, diaphoresis, fatigue and fever.   HENT: Negative for ear discharge, ear pain, hearing loss, mouth sores, nosebleeds and sinus pressure.    Eyes: Negative.  Negative for photophobia, pain, discharge, redness and itching.   Respiratory: Negative for cough, chest tightness and shortness of breath.    Cardiovascular: Negative for  chest pain, palpitations and leg swelling.   Gastrointestinal: Negative for abdominal distention, blood in stool and nausea.   Endocrine: Negative for cold intolerance, heat intolerance, polydipsia and polyphagia.   Genitourinary: Negative for flank pain, genital sores and hematuria.   Musculoskeletal: Positive for arthralgias. Negative for back pain, gait problem, joint swelling, myalgias, neck pain and neck stiffness.   Skin: Negative for color change, pallor and rash.   Neurological: Negative for dizziness, weakness, light-headedness and headaches.   Hematological: Negative for adenopathy. Does not bruise/bleed easily.   Psychiatric/Behavioral: Negative for decreased concentration and hallucinations. The patient is not nervous/anxious.          Objective:   There were no vitals taken for this visit.     Physical Exam   Constitutional: He appears obese.  Non-toxic appearance. No distress.   HENT:   Head: Normocephalic and atraumatic.   Right Ear: Tympanic membrane normal.   Left Ear: Tympanic membrane normal.   Nose: No rhinorrhea or nasal congestion.   Mouth/Throat: Mucous membranes are dry. No oropharyngeal exudate or posterior oropharyngeal erythema.   Eyes: Pupils are equal, round, and reactive to light. Right eye exhibits no discharge. Left eye exhibits no discharge.   Cardiovascular: Normal rate and regular rhythm. Exam reveals no gallop and no friction rub.   No murmur heard.  Pulmonary/Chest: Effort normal and breath sounds normal. No stridor. No respiratory distress.   Abdominal: Soft. He exhibits no distension and no mass. There is no abdominal tenderness. No hernia.   Musculoskeletal:         General: No swelling or tenderness.   Neurological: He is alert. He displays no weakness. No cranial nerve deficit or sensory deficit.   Skin: No jaundice or pallor.        No data to display     Assessment:     57  year old M with PMH of crystal proven gout of right knee,RA, type II DM, HTN here for follow up of  rheumatoid arthritis.    #rheumatoid arthritis: he reports history of bilateral joint pain and swelling.No synovitis on exam today.  He has been on MTX 4 pills once a week for years without folic acid. Had been managed by rheum in [place].  Continue folic acid 1 mg po day  Continue MTX 4 pills once a week (Risks and benefits of initiating MTX discussed. Patient aware that risks include and not limited to lung toxicity, liver abnormalities, cell count abnormalities, malignancy, and allergic  reaction to medication. Patient agrees with starting medication.  Labs every 3 months      #gouty arthritis: he has history of crystal proven gout in right knee.  Labs  Continue allopurinol 300mg po qday            No diagnosis found.      30  * minutes of total time spent on the encounter, which includes face to face time and non-face to face time preparing to see the patient (eg, review of tests), Obtaining and/or reviewing separately obtained history, Documenting clinical information in the electronic or other health record, Independently interpreting results (not separately reported) and communicating results to the patient/family/caregiver, or Care coordination (not separately reported).       Rtc in 12 weeks

## 2023-03-08 NOTE — PROGRESS NOTES
Subjective:       Patient ID: Josh Bell is a 56 y.o. male.    Chief Complaint: No chief complaint on file.    HPI 56 year old M with PMH of crystal proven gout of right knee,RA,  HTN here for evaluation. Around age 45, he developed pain and swelling of left knee and left ankle. Saw orthopedics and they did xrays.He was started on allopurinol at age 45 but he was not consistent with it.   He reports he was told to have xrays concerning for RA in right ankle. He was given celebrex which he took for several months.  He had episode of right knee pain and swelling in 2019.  Aspiration consistent with gouty arthritis. At some point, he started to have pain daily. He was having pain in shoulder, left elbow pain, hands, knees, ankles,and the feet.  He saw rheumatologist in [place] that diagnosed him with RA. He is taking MTX 4 pills once a week. He has never taken daily folic acid.  He is taking allopurinol 300mg po q day. He used to take celebrex 200mg once a day and stopped it 6 months ago because he didn't need it any more.  He had recent flare involving involving right knee and lasted a few days and then it went away.Denies history of psoriasis.    Denies history of podagra. He reports he has history of podagra.     Family hx: aunt: gout      Interval history: He is taking MTX 4 pills once a week.  He is taking folic acid.  Denies any joint pain, swelling or stiffness.      Past Medical History:   Diagnosis Date    Gout        Review of Systems   Constitutional: Negative for activity change, appetite change, chills, diaphoresis, fatigue and fever.   HENT: Negative for ear discharge, ear pain, hearing loss, mouth sores, nosebleeds and sinus pressure.    Eyes: Negative.  Negative for photophobia, pain, discharge, redness and itching.   Respiratory: Negative for cough, chest tightness and shortness of breath.    Cardiovascular: Negative for chest pain, palpitations and leg swelling.   Gastrointestinal: Negative  for abdominal distention, blood in stool and nausea.   Endocrine: Negative for cold intolerance, heat intolerance, polydipsia and polyphagia.   Genitourinary: Negative for flank pain, genital sores and hematuria.   Musculoskeletal: Positive for arthralgias. Negative for back pain, gait problem, joint swelling, myalgias, neck pain and neck stiffness.   Skin: Negative for color change, pallor and rash.   Neurological: Negative for dizziness, weakness, light-headedness and headaches.   Hematological: Negative for adenopathy. Does not bruise/bleed easily.   Psychiatric/Behavioral: Negative for decreased concentration and hallucinations. The patient is not nervous/anxious.          Objective:   There were no vitals taken for this visit.     Physical Exam   Constitutional: He appears obese.  Non-toxic appearance. No distress.   HENT:   Head: Normocephalic and atraumatic.   Right Ear: Tympanic membrane normal.   Left Ear: Tympanic membrane normal.   Nose: No rhinorrhea or nasal congestion.   Mouth/Throat: Mucous membranes are dry. No oropharyngeal exudate or posterior oropharyngeal erythema.   Eyes: Pupils are equal, round, and reactive to light. Right eye exhibits no discharge. Left eye exhibits no discharge.   Cardiovascular: Normal rate and regular rhythm. Exam reveals no gallop and no friction rub.   No murmur heard.  Pulmonary/Chest: Effort normal and breath sounds normal. No stridor. No respiratory distress.   Abdominal: Soft. He exhibits no distension and no mass. There is no abdominal tenderness. No hernia.   Musculoskeletal:         General: No swelling or tenderness.   Neurological: He is alert. He displays no weakness. No cranial nerve deficit or sensory deficit.   Skin: No jaundice or pallor.        No data to display     Assessment:     57  year old M with PMH of crystal proven gout of right knee,RA, type II DM, HTN here for follow up of rheumatoid arthritis.    #rheumatoid arthritis: he reports history of  bilateral joint pain and swelling.No synovitis on exam today.  He has been on MTX 4 pills once a week for years without folic acid. Had been managed by rheum in [place].  Continue folic acid 1 mg po day  Continue MTX 4 pills once a week (Risks and benefits of initiating MTX discussed. Patient aware that risks include and not limited to lung toxicity, liver abnormalities, cell count abnormalities, malignancy, and allergic  reaction to medication. Patient agrees with starting medication.  Labs every 3 months      #gouty arthritis: he has history of crystal proven gout in right knee.  Labs  Continue allopurinol 300mg po qday            No diagnosis found.      30  * minutes of total time spent on the encounter, which includes face to face time and non-face to face time preparing to see the patient (eg, review of tests), Obtaining and/or reviewing separately obtained history, Documenting clinical information in the electronic or other health record, Independently interpreting results (not separately reported) and communicating results to the patient/family/caregiver, or Care coordination (not separately reported).     Labs every 3 months  Rtc in a year

## 2023-04-03 ENCOUNTER — LAB VISIT (OUTPATIENT)
Dept: LAB | Facility: HOSPITAL | Age: 58
End: 2023-04-03
Attending: INTERNAL MEDICINE
Payer: COMMERCIAL

## 2023-04-03 DIAGNOSIS — M17.11 PRIMARY OSTEOARTHRITIS OF RIGHT KNEE: Primary | ICD-10-CM

## 2023-04-03 DIAGNOSIS — M17.11 PRIMARY OSTEOARTHRITIS OF RIGHT KNEE: ICD-10-CM

## 2023-04-03 LAB
ALBUMIN SERPL BCP-MCNC: 4.5 G/DL (ref 3.5–5.2)
ALP SERPL-CCNC: 52 U/L (ref 55–135)
ALT SERPL W/O P-5'-P-CCNC: 28 U/L (ref 10–44)
ANION GAP SERPL CALC-SCNC: 9 MMOL/L (ref 8–16)
AST SERPL-CCNC: 24 U/L (ref 10–40)
BASOPHILS # BLD AUTO: 0.02 K/UL (ref 0–0.2)
BASOPHILS NFR BLD: 0.5 % (ref 0–1.9)
BILIRUB SERPL-MCNC: 0.7 MG/DL (ref 0.1–1)
BUN SERPL-MCNC: 12 MG/DL (ref 6–20)
CALCIUM SERPL-MCNC: 9.4 MG/DL (ref 8.7–10.5)
CHLORIDE SERPL-SCNC: 105 MMOL/L (ref 95–110)
CO2 SERPL-SCNC: 25 MMOL/L (ref 23–29)
CREAT SERPL-MCNC: 0.9 MG/DL (ref 0.5–1.4)
CRP SERPL-MCNC: <0.3 MG/L (ref 0–8.2)
DIFFERENTIAL METHOD: ABNORMAL
EOSINOPHIL # BLD AUTO: 0.1 K/UL (ref 0–0.5)
EOSINOPHIL NFR BLD: 1.6 % (ref 0–8)
ERYTHROCYTE [DISTWIDTH] IN BLOOD BY AUTOMATED COUNT: 12.2 % (ref 11.5–14.5)
ERYTHROCYTE [SEDIMENTATION RATE] IN BLOOD BY PHOTOMETRIC METHOD: 4 MM/HR (ref 0–23)
EST. GFR  (NO RACE VARIABLE): >60 ML/MIN/1.73 M^2
GLUCOSE SERPL-MCNC: 93 MG/DL (ref 70–110)
HCT VFR BLD AUTO: 44.3 % (ref 40–54)
HGB BLD-MCNC: 14.4 G/DL (ref 14–18)
IMM GRANULOCYTES # BLD AUTO: 0.01 K/UL (ref 0–0.04)
IMM GRANULOCYTES NFR BLD AUTO: 0.2 % (ref 0–0.5)
LYMPHOCYTES # BLD AUTO: 1.2 K/UL (ref 1–4.8)
LYMPHOCYTES NFR BLD: 27.9 % (ref 18–48)
MCH RBC QN AUTO: 30.6 PG (ref 27–31)
MCHC RBC AUTO-ENTMCNC: 32.5 G/DL (ref 32–36)
MCV RBC AUTO: 94 FL (ref 82–98)
MONOCYTES # BLD AUTO: 0.4 K/UL (ref 0.3–1)
MONOCYTES NFR BLD: 8.3 % (ref 4–15)
NEUTROPHILS # BLD AUTO: 2.7 K/UL (ref 1.8–7.7)
NEUTROPHILS NFR BLD: 61.5 % (ref 38–73)
NRBC BLD-RTO: 0 /100 WBC
PLATELET # BLD AUTO: 143 K/UL (ref 150–450)
PMV BLD AUTO: 10.8 FL (ref 9.2–12.9)
POTASSIUM SERPL-SCNC: 4.1 MMOL/L (ref 3.5–5.1)
PROT SERPL-MCNC: 7.3 G/DL (ref 6–8.4)
RBC # BLD AUTO: 4.7 M/UL (ref 4.6–6.2)
SODIUM SERPL-SCNC: 139 MMOL/L (ref 136–145)
URATE SERPL-MCNC: 6.5 MG/DL (ref 3.4–7)
WBC # BLD AUTO: 4.34 K/UL (ref 3.9–12.7)

## 2023-04-03 PROCEDURE — 85025 COMPLETE CBC W/AUTO DIFF WBC: CPT | Performed by: INTERNAL MEDICINE

## 2023-04-03 PROCEDURE — 36415 COLL VENOUS BLD VENIPUNCTURE: CPT | Mod: PO | Performed by: INTERNAL MEDICINE

## 2023-04-03 PROCEDURE — 80053 COMPREHEN METABOLIC PANEL: CPT | Performed by: INTERNAL MEDICINE

## 2023-04-03 PROCEDURE — 84550 ASSAY OF BLOOD/URIC ACID: CPT | Performed by: INTERNAL MEDICINE

## 2023-04-03 PROCEDURE — 86140 C-REACTIVE PROTEIN: CPT | Performed by: INTERNAL MEDICINE

## 2023-04-03 PROCEDURE — 85652 RBC SED RATE AUTOMATED: CPT | Performed by: INTERNAL MEDICINE

## 2023-04-10 ENCOUNTER — TELEPHONE (OUTPATIENT)
Dept: RHEUMATOLOGY | Facility: CLINIC | Age: 58
End: 2023-04-10
Payer: COMMERCIAL

## 2023-04-10 NOTE — TELEPHONE ENCOUNTER
Spoke with the patient and let him kknow that Dr Esparza wanted him to get his labs done in a month

## 2023-05-04 ENCOUNTER — LAB VISIT (OUTPATIENT)
Dept: LAB | Facility: HOSPITAL | Age: 58
End: 2023-05-04
Attending: INTERNAL MEDICINE
Payer: COMMERCIAL

## 2023-05-04 DIAGNOSIS — M17.11 PRIMARY OSTEOARTHRITIS OF RIGHT KNEE: ICD-10-CM

## 2023-05-04 LAB
ALBUMIN SERPL BCP-MCNC: 4.2 G/DL (ref 3.5–5.2)
ALP SERPL-CCNC: 53 U/L (ref 55–135)
ALT SERPL W/O P-5'-P-CCNC: 18 U/L (ref 10–44)
ANION GAP SERPL CALC-SCNC: 6 MMOL/L (ref 8–16)
AST SERPL-CCNC: 17 U/L (ref 10–40)
BASOPHILS # BLD AUTO: 0.03 K/UL (ref 0–0.2)
BASOPHILS NFR BLD: 0.7 % (ref 0–1.9)
BILIRUB SERPL-MCNC: 0.4 MG/DL (ref 0.1–1)
BUN SERPL-MCNC: 14 MG/DL (ref 6–20)
CALCIUM SERPL-MCNC: 9.2 MG/DL (ref 8.7–10.5)
CHLORIDE SERPL-SCNC: 104 MMOL/L (ref 95–110)
CO2 SERPL-SCNC: 29 MMOL/L (ref 23–29)
CREAT SERPL-MCNC: 0.8 MG/DL (ref 0.5–1.4)
CRP SERPL-MCNC: 1.7 MG/L (ref 0–8.2)
DIFFERENTIAL METHOD: ABNORMAL
EOSINOPHIL # BLD AUTO: 0.1 K/UL (ref 0–0.5)
EOSINOPHIL NFR BLD: 2.1 % (ref 0–8)
ERYTHROCYTE [DISTWIDTH] IN BLOOD BY AUTOMATED COUNT: 11.9 % (ref 11.5–14.5)
ERYTHROCYTE [SEDIMENTATION RATE] IN BLOOD BY PHOTOMETRIC METHOD: 4 MM/HR (ref 0–23)
EST. GFR  (NO RACE VARIABLE): >60 ML/MIN/1.73 M^2
GLUCOSE SERPL-MCNC: 96 MG/DL (ref 70–110)
HCT VFR BLD AUTO: 43.2 % (ref 40–54)
HGB BLD-MCNC: 13.9 G/DL (ref 14–18)
IMM GRANULOCYTES # BLD AUTO: 0.01 K/UL (ref 0–0.04)
IMM GRANULOCYTES NFR BLD AUTO: 0.2 % (ref 0–0.5)
LYMPHOCYTES # BLD AUTO: 1.4 K/UL (ref 1–4.8)
LYMPHOCYTES NFR BLD: 31.4 % (ref 18–48)
MCH RBC QN AUTO: 30 PG (ref 27–31)
MCHC RBC AUTO-ENTMCNC: 32.2 G/DL (ref 32–36)
MCV RBC AUTO: 93 FL (ref 82–98)
MONOCYTES # BLD AUTO: 0.4 K/UL (ref 0.3–1)
MONOCYTES NFR BLD: 8.4 % (ref 4–15)
NEUTROPHILS # BLD AUTO: 2.5 K/UL (ref 1.8–7.7)
NEUTROPHILS NFR BLD: 57.2 % (ref 38–73)
NRBC BLD-RTO: 0 /100 WBC
PLATELET # BLD AUTO: 148 K/UL (ref 150–450)
PMV BLD AUTO: 10.7 FL (ref 9.2–12.9)
POTASSIUM SERPL-SCNC: 4.2 MMOL/L (ref 3.5–5.1)
PROT SERPL-MCNC: 7 G/DL (ref 6–8.4)
RBC # BLD AUTO: 4.63 M/UL (ref 4.6–6.2)
SODIUM SERPL-SCNC: 139 MMOL/L (ref 136–145)
URATE SERPL-MCNC: 5.8 MG/DL (ref 3.4–7)
WBC # BLD AUTO: 4.39 K/UL (ref 3.9–12.7)

## 2023-05-04 PROCEDURE — 80053 COMPREHEN METABOLIC PANEL: CPT | Performed by: INTERNAL MEDICINE

## 2023-05-04 PROCEDURE — 36415 COLL VENOUS BLD VENIPUNCTURE: CPT | Mod: PO | Performed by: INTERNAL MEDICINE

## 2023-05-04 PROCEDURE — 86140 C-REACTIVE PROTEIN: CPT | Performed by: INTERNAL MEDICINE

## 2023-05-04 PROCEDURE — 85025 COMPLETE CBC W/AUTO DIFF WBC: CPT | Performed by: INTERNAL MEDICINE

## 2023-05-04 PROCEDURE — 85652 RBC SED RATE AUTOMATED: CPT | Performed by: INTERNAL MEDICINE

## 2023-05-04 PROCEDURE — 84550 ASSAY OF BLOOD/URIC ACID: CPT | Performed by: INTERNAL MEDICINE

## 2023-05-05 DIAGNOSIS — M06.9 RHEUMATOID ARTHRITIS INVOLVING MULTIPLE JOINTS: Primary | ICD-10-CM

## 2023-05-22 DIAGNOSIS — M06.9 RHEUMATOID ARTHRITIS INVOLVING MULTIPLE JOINTS: ICD-10-CM

## 2023-05-22 RX ORDER — METHOTREXATE 2.5 MG/1
10 TABLET ORAL
Qty: 16 TABLET | Refills: 3 | Status: SHIPPED | OUTPATIENT
Start: 2023-05-22 | End: 2023-10-16

## 2023-06-05 ENCOUNTER — LAB VISIT (OUTPATIENT)
Dept: LAB | Facility: HOSPITAL | Age: 58
End: 2023-06-05
Attending: INTERNAL MEDICINE
Payer: COMMERCIAL

## 2023-06-05 DIAGNOSIS — M06.9 RHEUMATOID ARTHRITIS INVOLVING MULTIPLE JOINTS: ICD-10-CM

## 2023-06-05 LAB
ALBUMIN SERPL BCP-MCNC: 4.6 G/DL (ref 3.5–5.2)
ALP SERPL-CCNC: 52 U/L (ref 55–135)
ALT SERPL W/O P-5'-P-CCNC: 18 U/L (ref 10–44)
ANION GAP SERPL CALC-SCNC: 9 MMOL/L (ref 8–16)
AST SERPL-CCNC: 16 U/L (ref 10–40)
BASOPHILS # BLD AUTO: 0.03 K/UL (ref 0–0.2)
BASOPHILS NFR BLD: 0.6 % (ref 0–1.9)
BILIRUB SERPL-MCNC: 0.7 MG/DL (ref 0.1–1)
BUN SERPL-MCNC: 17 MG/DL (ref 6–20)
CALCIUM SERPL-MCNC: 9.1 MG/DL (ref 8.7–10.5)
CHLORIDE SERPL-SCNC: 103 MMOL/L (ref 95–110)
CO2 SERPL-SCNC: 26 MMOL/L (ref 23–29)
CREAT SERPL-MCNC: 0.8 MG/DL (ref 0.5–1.4)
CRP SERPL-MCNC: <0.3 MG/L (ref 0–8.2)
DIFFERENTIAL METHOD: ABNORMAL
EOSINOPHIL # BLD AUTO: 0.1 K/UL (ref 0–0.5)
EOSINOPHIL NFR BLD: 1.6 % (ref 0–8)
ERYTHROCYTE [DISTWIDTH] IN BLOOD BY AUTOMATED COUNT: 12.5 % (ref 11.5–14.5)
ERYTHROCYTE [SEDIMENTATION RATE] IN BLOOD BY PHOTOMETRIC METHOD: <2 MM/HR (ref 0–23)
EST. GFR  (NO RACE VARIABLE): >60 ML/MIN/1.73 M^2
GLUCOSE SERPL-MCNC: 88 MG/DL (ref 70–110)
HCT VFR BLD AUTO: 46 % (ref 40–54)
HGB BLD-MCNC: 14.8 G/DL (ref 14–18)
IMM GRANULOCYTES # BLD AUTO: 0.02 K/UL (ref 0–0.04)
IMM GRANULOCYTES NFR BLD AUTO: 0.4 % (ref 0–0.5)
LYMPHOCYTES # BLD AUTO: 1.3 K/UL (ref 1–4.8)
LYMPHOCYTES NFR BLD: 25.6 % (ref 18–48)
MCH RBC QN AUTO: 29.9 PG (ref 27–31)
MCHC RBC AUTO-ENTMCNC: 32.2 G/DL (ref 32–36)
MCV RBC AUTO: 93 FL (ref 82–98)
MONOCYTES # BLD AUTO: 0.4 K/UL (ref 0.3–1)
MONOCYTES NFR BLD: 8.5 % (ref 4–15)
NEUTROPHILS # BLD AUTO: 3.3 K/UL (ref 1.8–7.7)
NEUTROPHILS NFR BLD: 63.3 % (ref 38–73)
NRBC BLD-RTO: 0 /100 WBC
PLATELET # BLD AUTO: 149 K/UL (ref 150–450)
PMV BLD AUTO: 10.6 FL (ref 9.2–12.9)
POTASSIUM SERPL-SCNC: 4.2 MMOL/L (ref 3.5–5.1)
PROT SERPL-MCNC: 7.7 G/DL (ref 6–8.4)
RBC # BLD AUTO: 4.95 M/UL (ref 4.6–6.2)
SODIUM SERPL-SCNC: 138 MMOL/L (ref 136–145)
URATE SERPL-MCNC: 6.5 MG/DL (ref 3.4–7)
WBC # BLD AUTO: 5.15 K/UL (ref 3.9–12.7)

## 2023-06-05 PROCEDURE — 84550 ASSAY OF BLOOD/URIC ACID: CPT | Performed by: INTERNAL MEDICINE

## 2023-06-05 PROCEDURE — 85025 COMPLETE CBC W/AUTO DIFF WBC: CPT | Performed by: INTERNAL MEDICINE

## 2023-06-05 PROCEDURE — 85652 RBC SED RATE AUTOMATED: CPT | Performed by: INTERNAL MEDICINE

## 2023-06-05 PROCEDURE — 86140 C-REACTIVE PROTEIN: CPT | Performed by: INTERNAL MEDICINE

## 2023-06-05 PROCEDURE — 36415 COLL VENOUS BLD VENIPUNCTURE: CPT | Mod: PO | Performed by: INTERNAL MEDICINE

## 2023-06-05 PROCEDURE — 80053 COMPREHEN METABOLIC PANEL: CPT | Performed by: INTERNAL MEDICINE

## 2023-09-01 ENCOUNTER — LAB VISIT (OUTPATIENT)
Dept: LAB | Facility: HOSPITAL | Age: 58
End: 2023-09-01
Attending: INTERNAL MEDICINE
Payer: COMMERCIAL

## 2023-09-01 DIAGNOSIS — M17.11 PRIMARY OSTEOARTHRITIS OF RIGHT KNEE: ICD-10-CM

## 2023-09-01 LAB
ALBUMIN SERPL BCP-MCNC: 4.5 G/DL (ref 3.5–5.2)
ALP SERPL-CCNC: 50 U/L (ref 55–135)
ALT SERPL W/O P-5'-P-CCNC: 18 U/L (ref 10–44)
ANION GAP SERPL CALC-SCNC: 9 MMOL/L (ref 8–16)
AST SERPL-CCNC: 16 U/L (ref 10–40)
BASOPHILS # BLD AUTO: 0.03 K/UL (ref 0–0.2)
BASOPHILS NFR BLD: 0.7 % (ref 0–1.9)
BILIRUB SERPL-MCNC: 0.6 MG/DL (ref 0.1–1)
BUN SERPL-MCNC: 21 MG/DL (ref 6–20)
CALCIUM SERPL-MCNC: 9.5 MG/DL (ref 8.7–10.5)
CHLORIDE SERPL-SCNC: 104 MMOL/L (ref 95–110)
CO2 SERPL-SCNC: 27 MMOL/L (ref 23–29)
CREAT SERPL-MCNC: 0.8 MG/DL (ref 0.5–1.4)
CRP SERPL-MCNC: 0.6 MG/L (ref 0–8.2)
DIFFERENTIAL METHOD: ABNORMAL
EOSINOPHIL # BLD AUTO: 0.1 K/UL (ref 0–0.5)
EOSINOPHIL NFR BLD: 2 % (ref 0–8)
ERYTHROCYTE [DISTWIDTH] IN BLOOD BY AUTOMATED COUNT: 12.4 % (ref 11.5–14.5)
ERYTHROCYTE [SEDIMENTATION RATE] IN BLOOD BY PHOTOMETRIC METHOD: 5 MM/HR (ref 0–23)
EST. GFR  (NO RACE VARIABLE): >60 ML/MIN/1.73 M^2
GLUCOSE SERPL-MCNC: 97 MG/DL (ref 70–110)
HCT VFR BLD AUTO: 45.6 % (ref 40–54)
HGB BLD-MCNC: 14.5 G/DL (ref 14–18)
IMM GRANULOCYTES # BLD AUTO: 0.01 K/UL (ref 0–0.04)
IMM GRANULOCYTES NFR BLD AUTO: 0.2 % (ref 0–0.5)
LYMPHOCYTES # BLD AUTO: 1.4 K/UL (ref 1–4.8)
LYMPHOCYTES NFR BLD: 30.5 % (ref 18–48)
MCH RBC QN AUTO: 29.8 PG (ref 27–31)
MCHC RBC AUTO-ENTMCNC: 31.8 G/DL (ref 32–36)
MCV RBC AUTO: 94 FL (ref 82–98)
MONOCYTES # BLD AUTO: 0.4 K/UL (ref 0.3–1)
MONOCYTES NFR BLD: 9.2 % (ref 4–15)
NEUTROPHILS # BLD AUTO: 2.6 K/UL (ref 1.8–7.7)
NEUTROPHILS NFR BLD: 57.4 % (ref 38–73)
NRBC BLD-RTO: 0 /100 WBC
PLATELET # BLD AUTO: 158 K/UL (ref 150–450)
PMV BLD AUTO: 10.7 FL (ref 9.2–12.9)
POTASSIUM SERPL-SCNC: 3.9 MMOL/L (ref 3.5–5.1)
PROT SERPL-MCNC: 7.4 G/DL (ref 6–8.4)
RBC # BLD AUTO: 4.87 M/UL (ref 4.6–6.2)
SODIUM SERPL-SCNC: 140 MMOL/L (ref 136–145)
URATE SERPL-MCNC: 6.5 MG/DL (ref 3.4–7)
WBC # BLD AUTO: 4.56 K/UL (ref 3.9–12.7)

## 2023-09-01 PROCEDURE — 85652 RBC SED RATE AUTOMATED: CPT | Performed by: INTERNAL MEDICINE

## 2023-09-01 PROCEDURE — 84550 ASSAY OF BLOOD/URIC ACID: CPT | Performed by: INTERNAL MEDICINE

## 2023-09-01 PROCEDURE — 80053 COMPREHEN METABOLIC PANEL: CPT | Performed by: INTERNAL MEDICINE

## 2023-09-01 PROCEDURE — 36415 COLL VENOUS BLD VENIPUNCTURE: CPT | Mod: PO | Performed by: INTERNAL MEDICINE

## 2023-09-01 PROCEDURE — 86140 C-REACTIVE PROTEIN: CPT | Performed by: INTERNAL MEDICINE

## 2023-09-01 PROCEDURE — 85025 COMPLETE CBC W/AUTO DIFF WBC: CPT | Performed by: INTERNAL MEDICINE

## 2023-10-15 DIAGNOSIS — M06.9 RHEUMATOID ARTHRITIS INVOLVING MULTIPLE JOINTS: ICD-10-CM

## 2023-10-16 RX ORDER — METHOTREXATE 2.5 MG/1
10 TABLET ORAL
Qty: 16 TABLET | Refills: 3 | Status: SHIPPED | OUTPATIENT
Start: 2023-10-16 | End: 2024-02-07 | Stop reason: SDUPTHER

## 2023-12-01 ENCOUNTER — LAB VISIT (OUTPATIENT)
Dept: LAB | Facility: HOSPITAL | Age: 58
End: 2023-12-01
Attending: INTERNAL MEDICINE
Payer: COMMERCIAL

## 2023-12-01 DIAGNOSIS — M17.11 PRIMARY OSTEOARTHRITIS OF RIGHT KNEE: ICD-10-CM

## 2023-12-01 LAB
ALBUMIN SERPL BCP-MCNC: 4.5 G/DL (ref 3.5–5.2)
ALP SERPL-CCNC: 59 U/L (ref 55–135)
ALT SERPL W/O P-5'-P-CCNC: 18 U/L (ref 10–44)
ANION GAP SERPL CALC-SCNC: 10 MMOL/L (ref 8–16)
AST SERPL-CCNC: 17 U/L (ref 10–40)
BASOPHILS # BLD AUTO: 0.04 K/UL (ref 0–0.2)
BASOPHILS NFR BLD: 0.5 % (ref 0–1.9)
BILIRUB SERPL-MCNC: 0.3 MG/DL (ref 0.1–1)
BUN SERPL-MCNC: 12 MG/DL (ref 6–20)
CALCIUM SERPL-MCNC: 9.1 MG/DL (ref 8.7–10.5)
CHLORIDE SERPL-SCNC: 108 MMOL/L (ref 95–110)
CO2 SERPL-SCNC: 23 MMOL/L (ref 23–29)
CREAT SERPL-MCNC: 0.8 MG/DL (ref 0.5–1.4)
CRP SERPL-MCNC: 0.5 MG/L (ref 0–8.2)
DIFFERENTIAL METHOD: NORMAL
EOSINOPHIL # BLD AUTO: 0.1 K/UL (ref 0–0.5)
EOSINOPHIL NFR BLD: 0.9 % (ref 0–8)
ERYTHROCYTE [DISTWIDTH] IN BLOOD BY AUTOMATED COUNT: 12.6 % (ref 11.5–14.5)
ERYTHROCYTE [SEDIMENTATION RATE] IN BLOOD BY PHOTOMETRIC METHOD: 6 MM/HR (ref 0–23)
EST. GFR  (NO RACE VARIABLE): >60 ML/MIN/1.73 M^2
GLUCOSE SERPL-MCNC: 87 MG/DL (ref 70–110)
HCT VFR BLD AUTO: 43.1 % (ref 40–54)
HGB BLD-MCNC: 14.3 G/DL (ref 14–18)
IMM GRANULOCYTES # BLD AUTO: 0.03 K/UL (ref 0–0.04)
IMM GRANULOCYTES NFR BLD AUTO: 0.3 % (ref 0–0.5)
LYMPHOCYTES # BLD AUTO: 1.6 K/UL (ref 1–4.8)
LYMPHOCYTES NFR BLD: 18 % (ref 18–48)
MCH RBC QN AUTO: 30.7 PG (ref 27–31)
MCHC RBC AUTO-ENTMCNC: 33.2 G/DL (ref 32–36)
MCV RBC AUTO: 93 FL (ref 82–98)
MONOCYTES # BLD AUTO: 0.7 K/UL (ref 0.3–1)
MONOCYTES NFR BLD: 8.5 % (ref 4–15)
NEUTROPHILS # BLD AUTO: 6.2 K/UL (ref 1.8–7.7)
NEUTROPHILS NFR BLD: 71.8 % (ref 38–73)
NRBC BLD-RTO: 0 /100 WBC
PLATELET # BLD AUTO: 165 K/UL (ref 150–450)
PMV BLD AUTO: 10.6 FL (ref 9.2–12.9)
POTASSIUM SERPL-SCNC: 4.1 MMOL/L (ref 3.5–5.1)
PROT SERPL-MCNC: 7.6 G/DL (ref 6–8.4)
RBC # BLD AUTO: 4.66 M/UL (ref 4.6–6.2)
SODIUM SERPL-SCNC: 141 MMOL/L (ref 136–145)
URATE SERPL-MCNC: 6.2 MG/DL (ref 3.4–7)
WBC # BLD AUTO: 8.61 K/UL (ref 3.9–12.7)

## 2023-12-01 PROCEDURE — 80053 COMPREHEN METABOLIC PANEL: CPT | Performed by: INTERNAL MEDICINE

## 2023-12-01 PROCEDURE — 86140 C-REACTIVE PROTEIN: CPT | Performed by: INTERNAL MEDICINE

## 2023-12-01 PROCEDURE — 85025 COMPLETE CBC W/AUTO DIFF WBC: CPT | Performed by: INTERNAL MEDICINE

## 2023-12-01 PROCEDURE — 36415 COLL VENOUS BLD VENIPUNCTURE: CPT | Mod: PO | Performed by: INTERNAL MEDICINE

## 2023-12-01 PROCEDURE — 85652 RBC SED RATE AUTOMATED: CPT | Performed by: INTERNAL MEDICINE

## 2023-12-01 PROCEDURE — 84550 ASSAY OF BLOOD/URIC ACID: CPT | Performed by: INTERNAL MEDICINE

## 2023-12-01 RX ORDER — ALLOPURINOL 300 MG/1
300 TABLET ORAL
Qty: 90 TABLET | Refills: 0 | Status: SHIPPED | OUTPATIENT
Start: 2023-12-01

## 2023-12-01 RX ORDER — FOLIC ACID 1 MG/1
1000 TABLET ORAL
Qty: 90 TABLET | Refills: 0 | Status: SHIPPED | OUTPATIENT
Start: 2023-12-01 | End: 2024-02-07 | Stop reason: SDUPTHER

## 2024-02-05 ENCOUNTER — TELEPHONE (OUTPATIENT)
Dept: RHEUMATOLOGY | Facility: CLINIC | Age: 59
End: 2024-02-05
Payer: COMMERCIAL

## 2024-02-05 NOTE — TELEPHONE ENCOUNTER
Left voicemail letting patient know Dr. Esparza will no longer being going to the [place] location. Asked patient to contact the office back if wanting to reschedule the appointment or if needing direction to [facility] [place] location.

## 2024-02-07 ENCOUNTER — OFFICE VISIT (OUTPATIENT)
Dept: RHEUMATOLOGY | Facility: CLINIC | Age: 59
End: 2024-02-07
Payer: COMMERCIAL

## 2024-02-07 VITALS
WEIGHT: 235.88 LBS | SYSTOLIC BLOOD PRESSURE: 114 MMHG | HEART RATE: 76 BPM | BODY MASS INDEX: 31.26 KG/M2 | HEIGHT: 73 IN | DIASTOLIC BLOOD PRESSURE: 80 MMHG

## 2024-02-07 DIAGNOSIS — D84.821 IMMUNODEFICIENCY DUE TO DRUG THERAPY: Primary | ICD-10-CM

## 2024-02-07 DIAGNOSIS — Z79.631 ENCOUNTER FOR METHOTREXATE MONITORING: ICD-10-CM

## 2024-02-07 DIAGNOSIS — Z79.899 IMMUNODEFICIENCY DUE TO DRUG THERAPY: Primary | ICD-10-CM

## 2024-02-07 DIAGNOSIS — M06.9 RHEUMATOID ARTHRITIS INVOLVING MULTIPLE JOINTS: ICD-10-CM

## 2024-02-07 DIAGNOSIS — Z51.81 ENCOUNTER FOR METHOTREXATE MONITORING: ICD-10-CM

## 2024-02-07 PROCEDURE — 99999 PR PBB SHADOW E&M-EST. PATIENT-LVL III: CPT | Mod: PBBFAC,,, | Performed by: INTERNAL MEDICINE

## 2024-02-07 PROCEDURE — 99214 OFFICE O/P EST MOD 30 MIN: CPT | Mod: S$GLB,,, | Performed by: INTERNAL MEDICINE

## 2024-02-07 RX ORDER — FOLIC ACID 1 MG/1
1000 TABLET ORAL DAILY
Qty: 90 TABLET | Refills: 4 | Status: SHIPPED | OUTPATIENT
Start: 2024-02-07

## 2024-02-07 RX ORDER — METHOTREXATE 2.5 MG/1
10 TABLET ORAL
Qty: 48 TABLET | Refills: 4 | Status: SHIPPED | OUTPATIENT
Start: 2024-02-07 | End: 2024-05-07

## 2024-02-07 NOTE — PROGRESS NOTES
Subjective:       Patient ID: Josh Bell is a 56 y.o. male.    Chief Complaint: No chief complaint on file.    HPI 56 year old M with PMH of crystal proven gout of right knee,RA,  HTN here for evaluation. Around age 45, he developed pain and swelling of left knee and left ankle. Saw orthopedics and they did xrays.He was started on allopurinol at age 45 but he was not consistent with it.   He reports he was told to have xrays concerning for RA in right ankle. He was given celebrex which he took for several months.  He had episode of right knee pain and swelling in 2019.  Aspiration consistent with gouty arthritis. At some point, he started to have pain daily. He was having pain in shoulder, left elbow pain, hands, knees, ankles,and the feet.  He saw rheumatologist in [place] that diagnosed him with RA. He is taking MTX 4 pills once a week. He has never taken daily folic acid.  He is taking allopurinol 300mg po q day. He used to take celebrex 200mg once a day and stopped it 6 months ago because he didn't need it any more.  He had recent flare involving involving right knee and lasted a few days and then it went away.Denies history of psoriasis.    Denies history of podagra. He reports he has history of podagra.     Family hx: aunt: gout      Interval history: He is taking MTX 4 pills once a week.  He is taking folic acid. He is on allopurinol daily. Denies gout glares.  Denies any joint pain, swelling or stiffness.      Past Medical History:   Diagnosis Date    Gout        Review of Systems   Constitutional: Negative for activity change, appetite change, chills, diaphoresis, fatigue and fever.   HENT: Negative for ear discharge, ear pain, hearing loss, mouth sores, nosebleeds and sinus pressure.    Eyes: Negative.  Negative for photophobia, pain, discharge, redness and itching.   Respiratory: Negative for cough, chest tightness and shortness of breath.    Cardiovascular: Negative for chest pain, palpitations  and leg swelling.   Gastrointestinal: Negative for abdominal distention, blood in stool and nausea.   Endocrine: Negative for cold intolerance, heat intolerance, polydipsia and polyphagia.   Genitourinary: Negative for flank pain, genital sores and hematuria.   Musculoskeletal: Positive for arthralgias. Negative for back pain, gait problem, joint swelling, myalgias, neck pain and neck stiffness.   Skin: Negative for color change, pallor and rash.   Neurological: Negative for dizziness, weakness, light-headedness and headaches.   Hematological: Negative for adenopathy. Does not bruise/bleed easily.   Psychiatric/Behavioral: Negative for decreased concentration and hallucinations. The patient is not nervous/anxious.          Objective:   There were no vitals taken for this visit.     Physical Exam   Constitutional: He appears obese.  Non-toxic appearance. No distress.   HENT:   Head: Normocephalic and atraumatic.   Right Ear: Tympanic membrane normal.   Left Ear: Tympanic membrane normal.   Nose: No rhinorrhea or nasal congestion.   Mouth/Throat: Mucous membranes are dry. No oropharyngeal exudate or posterior oropharyngeal erythema.   Eyes: Pupils are equal, round, and reactive to light. Right eye exhibits no discharge. Left eye exhibits no discharge.   Cardiovascular: Normal rate and regular rhythm. Exam reveals no gallop and no friction rub.   No murmur heard.  Pulmonary/Chest: Effort normal and breath sounds normal. No stridor. No respiratory distress.   Abdominal: Soft. He exhibits no distension and no mass. There is no abdominal tenderness. No hernia.   Musculoskeletal:         General: No swelling or tenderness.   Neurological: He is alert. He displays no weakness. No cranial nerve deficit or sensory deficit.   Skin: No jaundice or pallor.        No data to display     Assessment:     58   year old M with PMH of crystal proven gout of right knee,RA, type II DM, HTN here for follow up of rheumatoid  arthritis.    #rheumatoid arthritis: he reports history of bilateral joint pain and swelling.No synovitis on exam today.  He has been on MTX 4 pills once a week for years without folic acid. Had been managed by rheum in [place].  Continue folic acid 1 mg po day  Continue MTX 4 pills once a week (Risks and benefits of initiating MTX discussed. Patient aware that risks include and not limited to lung toxicity, liver abnormalities, cell count abnormalities, malignancy, and allergic  reaction to medication. Patient agrees with starting medication.  Labs every 3 months      #gouty arthritis: he has history of crystal proven gout in right knee.  Labs  Continue allopurinol 300mg po qday            No diagnosis found.      30  * minutes of total time spent on the encounter, which includes face to face time and non-face to face time preparing to see the patient (eg, review of tests), Obtaining and/or reviewing separately obtained history, Documenting clinical information in the electronic or other health record, Independently interpreting results (not separately reported) and communicating results to the patient/family/caregiver, or Care coordination (not separately reported).     Labs every 3 months  Rtc in a year

## 2024-04-08 RX ORDER — ALLOPURINOL 300 MG/1
300 TABLET ORAL
Qty: 90 TABLET | Refills: 6 | Status: SHIPPED | OUTPATIENT
Start: 2024-04-08

## 2024-08-02 DIAGNOSIS — D84.821 IMMUNODEFICIENCY DUE TO DRUG THERAPY: Primary | ICD-10-CM

## 2024-08-02 DIAGNOSIS — Z79.899 IMMUNODEFICIENCY DUE TO DRUG THERAPY: Primary | ICD-10-CM

## 2024-08-05 ENCOUNTER — PATIENT MESSAGE (OUTPATIENT)
Dept: RHEUMATOLOGY | Facility: CLINIC | Age: 59
End: 2024-08-05
Payer: COMMERCIAL

## 2024-12-18 ENCOUNTER — PATIENT MESSAGE (OUTPATIENT)
Dept: RHEUMATOLOGY | Facility: CLINIC | Age: 59
End: 2024-12-18
Payer: COMMERCIAL

## 2024-12-18 DIAGNOSIS — M06.9 RHEUMATOID ARTHRITIS FLARE: Primary | ICD-10-CM

## 2025-05-21 DIAGNOSIS — M06.9 RHEUMATOID ARTHRITIS FLARE: Primary | ICD-10-CM

## 2025-05-21 RX ORDER — ALLOPURINOL 300 MG/1
300 TABLET ORAL DAILY
Qty: 90 TABLET | Refills: 0 | Status: SHIPPED | OUTPATIENT
Start: 2025-05-21

## 2025-05-22 ENCOUNTER — TELEPHONE (OUTPATIENT)
Dept: RHEUMATOLOGY | Facility: CLINIC | Age: 60
End: 2025-05-22
Payer: COMMERCIAL

## 2025-05-22 ENCOUNTER — PATIENT MESSAGE (OUTPATIENT)
Dept: RHEUMATOLOGY | Facility: CLINIC | Age: 60
End: 2025-05-22
Payer: COMMERCIAL

## 2025-05-22 DIAGNOSIS — M06.9 RHEUMATOID ARTHRITIS FLARE: Primary | ICD-10-CM

## 2025-06-13 ENCOUNTER — RESULTS FOLLOW-UP (OUTPATIENT)
Dept: RHEUMATOLOGY | Facility: CLINIC | Age: 60
End: 2025-06-13

## 2025-08-05 ENCOUNTER — OFFICE VISIT (OUTPATIENT)
Dept: RHEUMATOLOGY | Facility: CLINIC | Age: 60
End: 2025-08-05
Payer: COMMERCIAL

## 2025-08-05 VITALS
SYSTOLIC BLOOD PRESSURE: 123 MMHG | HEIGHT: 73 IN | DIASTOLIC BLOOD PRESSURE: 82 MMHG | OXYGEN SATURATION: 97 % | WEIGHT: 242.5 LBS | BODY MASS INDEX: 32.14 KG/M2 | HEART RATE: 74 BPM

## 2025-08-05 DIAGNOSIS — M06.00 SERONEGATIVE RHEUMATOID ARTHRITIS: Primary | ICD-10-CM

## 2025-08-05 DIAGNOSIS — M06.9 RHEUMATOID ARTHRITIS INVOLVING MULTIPLE JOINTS: ICD-10-CM

## 2025-08-05 PROCEDURE — 99999 PR PBB SHADOW E&M-EST. PATIENT-LVL III: CPT | Mod: PBBFAC,,, | Performed by: INTERNAL MEDICINE

## 2025-08-05 PROCEDURE — 99214 OFFICE O/P EST MOD 30 MIN: CPT | Mod: S$GLB,,, | Performed by: INTERNAL MEDICINE

## 2025-08-05 PROCEDURE — G2211 COMPLEX E/M VISIT ADD ON: HCPCS | Mod: S$GLB,,, | Performed by: INTERNAL MEDICINE

## 2025-08-05 RX ORDER — ALLOPURINOL 300 MG/1
300 TABLET ORAL DAILY
Qty: 90 TABLET | Refills: 4 | Status: SHIPPED | OUTPATIENT
Start: 2025-08-05

## 2025-08-05 RX ORDER — METHOTREXATE 2.5 MG/1
10 TABLET ORAL
Qty: 48 TABLET | Refills: 4 | Status: SHIPPED | OUTPATIENT
Start: 2025-08-05 | End: 2025-08-05 | Stop reason: ALTCHOICE

## 2025-08-05 RX ORDER — FOLIC ACID 1 MG/1
1 TABLET ORAL DAILY
Qty: 90 TABLET | Refills: 4 | Status: SHIPPED | OUTPATIENT
Start: 2025-08-05 | End: 2026-08-05

## 2025-08-05 NOTE — PROGRESS NOTES
Subjective:       Patient ID: Josh Bell is a 56 y.o. male.    Chief Complaint: No chief complaint on file.    HPI 56 year old M with PMH of crystal proven gout of right knee,RA,  HTN here for evaluation. Around age 45, he developed pain and swelling of left knee and left ankle. Saw orthopedics and they did xrays.He was started on allopurinol at age 45 but he was not consistent with it.   He reports he was told to have xrays concerning for RA in right ankle. He was given celebrex which he took for several months.  He had episode of right knee pain and swelling in 2019.  Aspiration consistent with gouty arthritis. At some point, he started to have pain daily. He was having pain in shoulder, left elbow pain, hands, knees, ankles,and the feet.  He saw rheumatologist in [place] that diagnosed him with RA. He is taking MTX 4 pills once a week. He has never taken daily folic acid.  He is taking allopurinol 300mg po q day. He used to take celebrex 200mg once a day and stopped it 6 months ago because he didn't need it any more.  He had recent flare involving involving right knee and lasted a few days and then it went away.Denies history of psoriasis.    Denies history of podagra. He reports he has history of podagra.     Family hx: aunt: gout      Interval history: He is taking MTX 4 pills once a week.  He is taking folic acid. He is on allopurinol daily. Denies gout glares.  Denies any joint pain, swelling or stiffness.      Past Medical History:   Diagnosis Date    Gout        Review of Systems   Constitutional: Negative for activity change, appetite change, chills, diaphoresis, fatigue and fever.   HENT: Negative for ear discharge, ear pain, hearing loss, mouth sores, nosebleeds and sinus pressure.    Eyes: Negative.  Negative for photophobia, pain, discharge, redness and itching.   Respiratory: Negative for cough, chest tightness and shortness of breath.    Cardiovascular: Negative for chest pain, palpitations  and leg swelling.   Gastrointestinal: Negative for abdominal distention, blood in stool and nausea.   Endocrine: Negative for cold intolerance, heat intolerance, polydipsia and polyphagia.   Genitourinary: Negative for flank pain, genital sores and hematuria.   Musculoskeletal: Positive for arthralgias. Negative for back pain, gait problem, joint swelling, myalgias, neck pain and neck stiffness.   Skin: Negative for color change, pallor and rash.   Neurological: Negative for dizziness, weakness, light-headedness and headaches.   Hematological: Negative for adenopathy. Does not bruise/bleed easily.   Psychiatric/Behavioral: Negative for decreased concentration and hallucinations. The patient is not nervous/anxious.          Objective:   There were no vitals taken for this visit.     Physical Exam   Constitutional: He appears obese.  Non-toxic appearance. No distress.   HENT:   Head: Normocephalic and atraumatic.   Right Ear: Tympanic membrane normal.   Left Ear: Tympanic membrane normal.   Nose: No rhinorrhea or nasal congestion.   Mouth/Throat: Mucous membranes are dry. No oropharyngeal exudate or posterior oropharyngeal erythema.   Eyes: Pupils are equal, round, and reactive to light. Right eye exhibits no discharge. Left eye exhibits no discharge.   Cardiovascular: Normal rate and regular rhythm. Exam reveals no gallop and no friction rub.   No murmur heard.  Pulmonary/Chest: Effort normal and breath sounds normal. No stridor. No respiratory distress.   Abdominal: Soft. He exhibits no distension and no mass. There is no abdominal tenderness. No hernia.   Musculoskeletal:         General: No swelling or tenderness.   Neurological: He is alert. He displays no weakness. No cranial nerve deficit or sensory deficit.   Skin: No jaundice or pallor.        No data to display     Assessment:   59  year old M with PMH of crystal proven gout of right knee,RA, type II DM, HTN here for follow up of rheumatoid arthritis.    #  Seronegative Rheumatoid arthritis: he reports history of bilateral joint pain and swelling.No synovitis on exam today.  He had been off MTX 4 pills once a week for several months and doing well. He would like to take break from MTX.    Continue folic acid 1 mg po day  Off MTX 4 pills once a week (Risks and benefits of initiating MTX discussed. Patient aware that risks include and not limited to lung toxicity, liver abnormalities, cell count abnormalities, malignancy, and allergic  reaction to medication. Patient agrees with starting medication.  Labs every 6  months      #gouty arthritis: he has history of crystal proven gout in right knee.  Labs  Continue allopurinol 300mg po qday                30  * minutes of total time spent on the encounter, which includes face to face time and non-face to face time preparing to see the patient (eg, review of tests), Obtaining and/or reviewing separately obtained history, Documenting clinical information in the electronic or other health record, Independently interpreting results (not separately reported) and communicating results to the patient/family/caregiver, or Care coordination (not separately reported).     Rtc in  6 months or sooner if needed

## 2025-08-05 NOTE — PROGRESS NOTES
Subjective:       Patient ID: Josh Bell is a 56 y.o. male.    Chief Complaint: No chief complaint on file.    HPI 56 year old M with PMH of crystal proven gout of right knee,RA,  HTN here for evaluation. Around age 45, he developed pain and swelling of left knee and left ankle. Saw orthopedics and they did xrays.He was started on allopurinol at age 45 but he was not consistent with it.   He reports he was told to have xrays concerning for RA in right ankle. He was given celebrex which he took for several months.  He had episode of right knee pain and swelling in 2019.  Aspiration consistent with gouty arthritis. At some point, he started to have pain daily. He was having pain in shoulder, left elbow pain, hands, knees, ankles,and the feet.  He saw rheumatologist in [place] that diagnosed him with RA. He is taking MTX 4 pills once a week. He has never taken daily folic acid.  He is taking allopurinol 300mg po q day. He used to take celebrex 200mg once a day and stopped it 6 months ago because he didn't need it any more.  He had recent flare involving involving right knee and lasted a few days and then it went away.Denies history of psoriasis.    Denies history of podagra. He reports he has history of podagra.     Family hx: aunt: gout      Interval history: He ran out of MTX about 5 weeks ago. He is taking folic acid. He is on allopurinol daily. Denies gout glares.  Denies any joint pain, swelling or stiffness.      Past Medical History:   Diagnosis Date    Gout        Review of Systems   Constitutional: Negative for activity change, appetite change, chills, diaphoresis, fatigue and fever.   HENT: Negative for ear discharge, ear pain, hearing loss, mouth sores, nosebleeds and sinus pressure.    Eyes: Negative.  Negative for photophobia, pain, discharge, redness and itching.   Respiratory: Negative for cough, chest tightness and shortness of breath.    Cardiovascular: Negative for chest pain, palpitations  and leg swelling.   Gastrointestinal: Negative for abdominal distention, blood in stool and nausea.   Endocrine: Negative for cold intolerance, heat intolerance, polydipsia and polyphagia.   Genitourinary: Negative for flank pain, genital sores and hematuria.   Musculoskeletal: Positive for arthralgias. Negative for back pain, gait problem, joint swelling, myalgias, neck pain and neck stiffness.   Skin: Negative for color change, pallor and rash.   Neurological: Negative for dizziness, weakness, light-headedness and headaches.   Hematological: Negative for adenopathy. Does not bruise/bleed easily.   Psychiatric/Behavioral: Negative for decreased concentration and hallucinations. The patient is not nervous/anxious.          Objective:   There were no vitals taken for this visit.     Physical Exam   Constitutional: He appears obese.  Non-toxic appearance. No distress.   HENT:   Head: Normocephalic and atraumatic.   Right Ear: Tympanic membrane normal.   Left Ear: Tympanic membrane normal.   Nose: No rhinorrhea or nasal congestion.   Mouth/Throat: Mucous membranes are dry. No oropharyngeal exudate or posterior oropharyngeal erythema.   Eyes: Pupils are equal, round, and reactive to light. Right eye exhibits no discharge. Left eye exhibits no discharge.   Cardiovascular: Normal rate and regular rhythm. Exam reveals no gallop and no friction rub.   No murmur heard.  Pulmonary/Chest: Effort normal and breath sounds normal. No stridor. No respiratory distress.   Abdominal: Soft. He exhibits no distension and no mass. There is no abdominal tenderness. No hernia.   Musculoskeletal:         General: No swelling or tenderness.   Neurological: He is alert. He displays no weakness. No cranial nerve deficit or sensory deficit.   Skin: No jaundice or pallor.        No data to display     Assessment:     58   year old M with PMH of crystal proven gout of right knee,RA, type II DM, HTN here for follow up of rheumatoid  arthritis.    #rheumatoid arthritis: he reports history of bilateral joint pain and swelling.No synovitis on exam today.  He has been on MTX 4 pills once a week for years without folic acid. Had been managed by rheum in [place].  Continue folic acid 1 mg po day  Continue MTX 4 pills once a week (Risks and benefits of initiating MTX discussed. Patient aware that risks include and not limited to lung toxicity, liver abnormalities, cell count abnormalities, malignancy, and allergic  reaction to medication. Patient agrees with starting medication.  Labs every 3 months      #gouty arthritis: he has history of crystal proven gout in right knee.  Labs  Continue allopurinol 300mg po qday            No diagnosis found.      30  * minutes of total time spent on the encounter, which includes face to face time and non-face to face time preparing to see the patient (eg, review of tests), Obtaining and/or reviewing separately obtained history, Documenting clinical information in the electronic or other health record, Independently interpreting results (not separately reported) and communicating results to the patient/family/caregiver, or Care coordination (not separately reported).     Labs every 3 months  Rtc in a year

## 2025-08-07 ASSESSMENT — ROUTINE ASSESSMENT OF PATIENT INDEX DATA (RAPID3)
PAIN SCORE: 2
FATIGUE SCORE: 2
TOTAL RAPID3 SCORE: 0.67
WHEN YOU AWAKENED IN THE MORNING OVER THE LAST WEEK, PLEASE INDICATE THE AMOUNT OF TIME IT TAKES UNTIL YOU ARE AS LIMBER AS YOU WILL BE FOR THE DAY: 1 MINUTES
PSYCHOLOGICAL DISTRESS SCORE: 0
PATIENT GLOBAL ASSESSMENT SCORE: 0
MDHAQ FUNCTION SCORE: 0
AM STIFFNESS SCORE: 1, YES

## 2025-08-26 RX ORDER — ALLOPURINOL 300 MG/1
300 TABLET ORAL DAILY
Qty: 90 TABLET | Refills: 4 | Status: SHIPPED | OUTPATIENT
Start: 2025-08-26